# Patient Record
Sex: MALE | Race: WHITE | NOT HISPANIC OR LATINO | Employment: FULL TIME | URBAN - METROPOLITAN AREA
[De-identification: names, ages, dates, MRNs, and addresses within clinical notes are randomized per-mention and may not be internally consistent; named-entity substitution may affect disease eponyms.]

---

## 2023-06-30 ENCOUNTER — OFFICE VISIT (OUTPATIENT)
Dept: URGENT CARE | Facility: CLINIC | Age: 38
End: 2023-06-30
Payer: COMMERCIAL

## 2023-06-30 VITALS
RESPIRATION RATE: 20 BRPM | OXYGEN SATURATION: 99 % | HEIGHT: 73 IN | BODY MASS INDEX: 24.12 KG/M2 | SYSTOLIC BLOOD PRESSURE: 103 MMHG | HEART RATE: 67 BPM | DIASTOLIC BLOOD PRESSURE: 83 MMHG | TEMPERATURE: 97.8 F | WEIGHT: 182 LBS

## 2023-06-30 DIAGNOSIS — L01.00 IMPETIGO: Primary | ICD-10-CM

## 2023-06-30 RX ORDER — TRIAMCINOLONE ACETONIDE 1 MG/G
CREAM TOPICAL 2 TIMES DAILY
Qty: 30 G | Refills: 0 | Status: SHIPPED | OUTPATIENT
Start: 2023-06-30

## 2023-06-30 RX ORDER — CEPHALEXIN 500 MG/1
500 CAPSULE ORAL EVERY 8 HOURS SCHEDULED
Qty: 21 CAPSULE | Refills: 0 | Status: SHIPPED | OUTPATIENT
Start: 2023-06-30 | End: 2023-07-07

## 2023-06-30 NOTE — PATIENT INSTRUCTIONS
Impetigo   WHAT YOU NEED TO KNOW:   Impetigo is a skin infection caused by bacteria  The infection can cause sores to form anywhere on your body  The sores develop watery or pus-filled blisters that break and form thick crusts  Impetigo is most common in children and spreads easily from person to person  DISCHARGE INSTRUCTIONS:   Return to the emergency department if:   You have painful, red, warm skin around the blisters  Your face is swollen  You urinate less than usual or there is blood in your urine  Contact your healthcare provider if:   You have a fever  The sores become more red, swollen, warm, or tender  The sores do not start to heal after 3 days of treatment  You have questions or concerns about your condition or care  Medicines:   Antibiotics  treat the bacterial infection  Antibiotics may be given as a pill or cream  Wash your skin and gently remove any crusts before you apply the antibiotic cream      Take your medicine as directed  Contact your healthcare provider if you think your medicine is not helping or if you have side effects  Tell your provider if you are allergic to any medicine  Keep a list of the medicines, vitamins, and herbs you take  Include the amounts, and when and why you take them  Bring the list or the pill bottles to follow-up visits  Carry your medicine list with you in case of an emergency  Prevent the spread of impetigo:   Avoid direct contact  You can spread impetigo if someone touches or uses something that touched your infected skin  You can also spread impetigo on your own body when you touch the area and then touch somewhere else  Keep the sores covered with gauze so you will not scratch or touch them  Keep your fingernails short  Your child may need to wear mittens so he does not scratch his sores  Wash your hands often  Always wash your hands after you touch the infected area  Wash your hands before you touch food, your eyes, or other people  If no water is available, use an alcohol-based gel to clean your hands  Wash household items  Do not share or reuse items that have come in contact with impetigo sores  Examples include bedding, towels, washcloths, and eating utensils  These items may be used again after they have been washed with hot water and soap  Clean your sores safely:  Wash your skin sores with antibacterial soap and water  You may need to do this 2 to 3 times each day until the sores heal  If the area is crusted, gently wash the sores with gauze or a clean washcloth to remove the crust  Pat the area dry with a clean towel  Wash your hands, the washcloth, and the towel after you clean the area around the sores  Return to work or school: You may return to work or school 48 hours after you start the antibiotic medicine  If your child has impetigo, tell his school or  center about the infection  Follow up with your doctor as directed:  Write down your questions so you remember to ask them during your visits  © Copyright [employer] 2022 Information is for End User's use only and may not be sold, redistributed or otherwise used for commercial purposes  The above information is an  only  It is not intended as medical advice for individual conditions or treatments  Talk to your doctor, nurse or pharmacist before following any medical regimen to see if it is safe and effective for you  Impetigo: Concern for underlying tinea secondary to the wrestling with superimposed impetigo/staphylococcal infection  Will treat with Keflex 500mg PO TID x 7 days as the rash has been worsening  Will also treat with topical bactroban  We discussed triamcinolone can be used for the contact dermatitis secondary to the bandaid  Keep the rash clean and dry otherwise  We discussed if there is underlying tinea we can treat once we clear the bacterial infection  If the rash spreads or worsens despite these measures follow up immediately

## 2023-06-30 NOTE — PROGRESS NOTES
[facility]        NAME: Rani Washington is a 41 y o  male  : 1985    MRN: [id number]  DATE: 2023  TIME: 11:34 AM    Assessment and Plan   Impetigo [L01 00]  1  Impetigo  triamcinolone (KENALOG) 0 1 % cream    mupirocin (BACTROBAN) 2 % ointment    cephalexin (KEFLEX) 500 mg capsule            Patient Instructions   Impetigo: Concern for underlying tinea secondary to the wrestling with superimposed impetigo/staphylococcal infection  Will treat with Keflex 500mg PO TID x 7 days as the rash has been worsening  Will also treat with topical bactroban  We discussed triamcinolone can be used for the contact dermatitis secondary to the bandaid  Keep the rash clean and dry otherwise  We discussed if there is underlying tinea we can treat once we clear the bacterial infection  If the rash spreads or worsens despite these measures follow up immediately  Follow up with PCP in 3-5 days  Proceed to  ER if symptoms worsen  Chief Complaint     Chief Complaint   Patient presents with   • Rash     Right upper arm vesicular rash  History of Present Illness       The patient is a 72-year-old male who presents today for an erythematous rash on the R upper arm  The patient states that two days ago he noticed a small papular lesion on the R upper arm that has progressed to a mildly pruritic lesion with worsening redness  There are now satellite lesions  No drainage, no pustular lesions  No fever, chills  He states that he does wrestle three times a week  No facial swelling, trouble swallowing  No new medications, lotions, detergents  No recent travel or sick contacts  No OTC measures  He states that he has had it covered with a bandaid which irritated the surrounding skin as well  Review of Systems   Review of Systems   Constitutional: Negative for activity change, appetite change, chills, diaphoresis, fatigue and fever     HENT: Negative for facial swelling, sore throat and trouble "swallowing  Respiratory: Negative for chest tightness, shortness of breath, wheezing and stridor  Cardiovascular: Negative for chest pain and palpitations  Skin: Positive for rash  Allergic/Immunologic: Negative for environmental allergies, food allergies and immunocompromised state  Neurological: Negative for dizziness and light-headedness  Hematological: Negative for adenopathy  Does not bruise/bleed easily  Current Medications       Current Outpatient Medications:   •  cephalexin (KEFLEX) 500 mg capsule, Take 1 capsule (500 mg total) by mouth every 8 (eight) hours for 7 days, Disp: 21 capsule, Rfl: 0  •  mupirocin (BACTROBAN) 2 % ointment, Apply topically 3 (three) times a day, Disp: 22 g, Rfl: 0  •  triamcinolone (KENALOG) 0 1 % cream, Apply topically 2 (two) times a day, Disp: 30 g, Rfl: 0    Current Allergies     Allergies as of 06/30/2023 - Reviewed 06/30/2023   Allergen Reaction Noted   • Vancomycin Vomiting 06/30/2023            The following portions of the patient's history were reviewed and updated as appropriate: allergies, current medications, past family history, past medical history, past social history, past surgical history and problem list      History reviewed  No pertinent past medical history  History reviewed  No pertinent surgical history  History reviewed  No pertinent family history  Medications have been verified  Objective   /83   Pulse 67   Temp 97 8 °F (36 6 °C)   Resp 20   Ht 6' 1\" (1 854 m)   Wt 82 6 kg (182 lb)   SpO2 99%   BMI 24 01 kg/m²   No LMP for male patient  Physical Exam     Physical Exam  Vitals and nursing note reviewed  Constitutional:       General: He is not in acute distress  Appearance: He is well-developed  He is not diaphoretic  HENT:      Mouth/Throat:      Pharynx: Uvula midline  Cardiovascular:      Rate and Rhythm: Normal rate and regular rhythm  Pulses: Normal pulses        Heart sounds: " Normal heart sounds, S1 normal and S2 normal  No murmur heard  Pulmonary:      Effort: Pulmonary effort is normal  No tachypnea, accessory muscle usage or respiratory distress  Breath sounds: Normal breath sounds  No stridor  No decreased breath sounds, wheezing, rhonchi or rales  Skin:     Capillary Refill: Capillary refill takes less than 2 seconds  Findings: Rash present  Rash is macular and papular  Comments: There is a 1cm maculopapular lesion on an erythematous base with honey colored crusting on the volar aspect of the R upper arm  There are 2-3 satellite lesions around the central lesion with eschar and honey colored crusting  There is a single lesion on the dorsal aspect of the L upper arm  No intact pustules or vesicles seen  No obvious tinea infection underlying

## 2023-07-08 ENCOUNTER — OFFICE VISIT (OUTPATIENT)
Dept: URGENT CARE | Facility: CLINIC | Age: 38
End: 2023-07-08
Payer: COMMERCIAL

## 2023-07-08 VITALS
OXYGEN SATURATION: 99 % | HEIGHT: 73 IN | SYSTOLIC BLOOD PRESSURE: 120 MMHG | BODY MASS INDEX: 24.12 KG/M2 | WEIGHT: 182 LBS | RESPIRATION RATE: 16 BRPM | TEMPERATURE: 97.1 F | DIASTOLIC BLOOD PRESSURE: 78 MMHG | HEART RATE: 64 BPM

## 2023-07-08 DIAGNOSIS — B35.9 TINEA: Primary | ICD-10-CM

## 2023-07-08 PROCEDURE — 99213 OFFICE O/P EST LOW 20 MIN: CPT | Performed by: PHYSICIAN ASSISTANT

## 2023-07-08 RX ORDER — CLOTRIMAZOLE 1 %
CREAM (GRAM) TOPICAL 2 TIMES DAILY
Qty: 85 G | Refills: 0 | Status: SHIPPED | OUTPATIENT
Start: 2023-07-08

## 2023-07-08 NOTE — PROGRESS NOTES
[facility]        NAME: Kasandra Randhawa is a 41 y.o. male  : 1985    MRN: [id number]  DATE: 2023  TIME: 1:42 PM    Assessment and Plan   Tinea [B35.9]  1. Tinea  clotrimazole (LOTRIMIN) 1 % cream    Ambulatory Referral to Dermatology            Patient Instructions     Patient Instructions   Will treat tinea with clotrimazole to be used as instructed. Advised to continue Dr. Cj Cao ointment around affected area as well in between applications. Provided referral to dermatology if symptoms are not improving or resolving over the next 3 to 4 days. With any progression or worsening of symptoms to return or be seen in ER. Follow up with PCP in 3-5 days. Proceed to  ER if symptoms worsen. Chief Complaint     Chief Complaint   Patient presents with   • Rash     Rash re-check         History of Present Illness       Patient is a 61-year-old male presenting today for reevaluation of rash x2 weeks. Patient was seen and evaluated here on 2023, was diagnosed with impetigo at the time, was treated with a course of Keflex, mupirocin and triamcinolone, notes that the rash quickly improved but states that there is still a few small lesions that are not healing well. Mentions discussion of possible fungal infection were had at last visit. Denies fever, discharge or drainage, N/V/D, numbness, tingling. Review of Systems   Review of Systems   Constitutional: Negative for chills and fever. HENT: Negative for ear pain and sore throat. Eyes: Negative for pain and visual disturbance. Respiratory: Negative for cough and shortness of breath. Cardiovascular: Negative for chest pain and palpitations. Gastrointestinal: Negative for abdominal pain and vomiting. Genitourinary: Negative for dysuria and hematuria. Musculoskeletal: Negative for arthralgias and back pain. Skin: Positive for rash. Neurological: Negative for seizures and syncope.    All other systems reviewed and are negative. Current Medications       Current Outpatient Medications:   •  clotrimazole (LOTRIMIN) 1 % cream, Apply topically 2 (two) times a day, Disp: 85 g, Rfl: 0  •  mupirocin (BACTROBAN) 2 % ointment, Apply topically 3 (three) times a day, Disp: 22 g, Rfl: 0  •  triamcinolone (KENALOG) 0.1 % cream, Apply topically 2 (two) times a day, Disp: 30 g, Rfl: 0    Current Allergies     Allergies as of 07/08/2023 - Reviewed 07/08/2023   Allergen Reaction Noted   • Vancomycin Vomiting 06/30/2023            The following portions of the patient's history were reviewed and updated as appropriate: allergies, current medications, past family history, past medical history, past social history, past surgical history and problem list.     History reviewed. No pertinent past medical history. History reviewed. No pertinent surgical history. History reviewed. No pertinent family history. Medications have been verified. Objective   /78   Pulse 64   Temp (!) 97.1 °F (36.2 °C)   Resp 16   Ht 6' 1" (1.854 m)   Wt 82.6 kg (182 lb)   SpO2 99%   BMI 24.01 kg/m²        Physical Exam     Physical Exam  Vitals and nursing note reviewed. Constitutional:       General: He is not in acute distress. Appearance: Normal appearance. HENT:      Head: Normocephalic. Cardiovascular:      Rate and Rhythm: Normal rate and regular rhythm. Pulses: Normal pulses. Heart sounds: Normal heart sounds. Pulmonary:      Effort: Pulmonary effort is normal.      Breath sounds: Normal breath sounds. Skin:     General: Skin is warm. Capillary Refill: Capillary refill takes less than 2 seconds. Findings: Rash present. Comments: There is a 1cm maculopapular lesion on an erythematous base on the volar aspect of the R upper arm. There are 2-3 satellite lesions around the central lesiong. There is a single lesion on the dorsal aspect of the L upper arm.   No surrounding erythema or streaking, no active discharge or drainage from areas   Neurological:      Mental Status: He is alert.

## 2023-07-08 NOTE — PATIENT INSTRUCTIONS
Will treat tinea with clotrimazole to be used as instructed. Advised to continue Dr. Juan Vasquez ointment around affected area as well in between applications. Provided referral to dermatology if symptoms are not improving or resolving over the next 3 to 4 days. With any progression or worsening of symptoms to return or be seen in ER.

## 2023-07-29 ENCOUNTER — OFFICE VISIT (OUTPATIENT)
Dept: URGENT CARE | Facility: CLINIC | Age: 38
End: 2023-07-29

## 2023-07-29 VITALS
WEIGHT: 183 LBS | HEART RATE: 48 BPM | BODY MASS INDEX: 24.25 KG/M2 | HEIGHT: 73 IN | OXYGEN SATURATION: 97 % | RESPIRATION RATE: 20 BRPM | TEMPERATURE: 97 F

## 2023-07-29 DIAGNOSIS — R23.8 VESICULAR RASH: Primary | ICD-10-CM

## 2023-07-29 PROCEDURE — 87205 SMEAR GRAM STAIN: CPT | Performed by: PHYSICIAN ASSISTANT

## 2023-07-29 PROCEDURE — 87186 SC STD MICRODIL/AGAR DIL: CPT | Performed by: PHYSICIAN ASSISTANT

## 2023-07-29 PROCEDURE — 87147 CULTURE TYPE IMMUNOLOGIC: CPT | Performed by: PHYSICIAN ASSISTANT

## 2023-07-29 PROCEDURE — 87070 CULTURE OTHR SPECIMN AEROBIC: CPT | Performed by: PHYSICIAN ASSISTANT

## 2023-07-29 NOTE — PROGRESS NOTES
[facility]        NAME: Francine Irvin is a 41 y.o. male  : 1985    MRN: [id number]  DATE: 2023  TIME: 11:39 AM    Assessment and Plan   Vesicular rash [R23.8]  1. Vesicular rash  Ambulatory Referral to Wound Care            Patient Instructions   Impetigo: Will culture the lesion to r/o staph infection. The patients son is being treated for bullous impetigo, the patients presentation is also consistent with impetigo. We discussed triamcinolone can be used for the itching but that he should be applying the Bactroban three times daily. Keep the rash clean and dry otherwise. If the rash spreads or worsens despite these measures follow up immediately. Dermatology referral given, he should be seen within the next 5 days. Follow up with PCP in 3-5 days. Proceed to  ER if symptoms worsen. Chief Complaint     Chief Complaint   Patient presents with   • Rash     Rt arm single infected area blisters with honey -like scabing         History of Present Illness       The patient is a 61-year-old male who presents today for ongoing/recurrent rash. This is his third visit with us for this problem. He was initially placed on bactroban and keflex for impetigo and then clotrimazole for underlying tinea. He was referred to dermatology. His son was diagnosed with bullous impetigo one week ago. The patient has a single lesion on the R upper arm with scabbing and serosanguinous drainage that began one day ago. The rash is pruritic, no burning or pain. No fever or chills. No OTC measures. Review of Systems   Review of Systems   Constitutional: Negative for activity change, appetite change, chills, diaphoresis, fatigue and fever. HENT: Negative for facial swelling, sore throat and trouble swallowing. Respiratory: Negative for chest tightness, shortness of breath, wheezing and stridor. Cardiovascular: Negative for chest pain and palpitations.    Musculoskeletal: Negative for arthralgias and myalgias. Skin: Positive for rash. Allergic/Immunologic: Negative for environmental allergies, food allergies and immunocompromised state. Neurological: Negative for dizziness and light-headedness. Hematological: Negative for adenopathy. Does not bruise/bleed easily. Current Medications       Current Outpatient Medications:   •  mupirocin (BACTROBAN) 2 % ointment, Apply topically 3 (three) times a day, Disp: 22 g, Rfl: 0  •  triamcinolone (KENALOG) 0.1 % cream, Apply topically 2 (two) times a day, Disp: 30 g, Rfl: 0    Current Allergies     Allergies as of 07/29/2023 - Reviewed 07/29/2023   Allergen Reaction Noted   • Fluconazole Fever 07/29/2023   • Vancomycin Vomiting 06/30/2023            The following portions of the patient's history were reviewed and updated as appropriate: allergies, current medications, past family history, past medical history, past social history, past surgical history and problem list.     No past medical history on file. No past surgical history on file. No family history on file. Medications have been verified. Objective   Pulse (!) 48   Temp (!) 97 °F (36.1 °C)   Resp 20   Ht 6' 1" (1.854 m)   Wt 83 kg (183 lb)   SpO2 97%   BMI 24.14 kg/m²   No LMP for male patient. Physical Exam     Physical Exam  Vitals and nursing note reviewed. Constitutional:       General: He is not in acute distress. Appearance: He is well-developed. He is not diaphoretic. HENT:      Mouth/Throat:      Pharynx: Uvula midline. Cardiovascular:      Rate and Rhythm: Normal rate and regular rhythm. Pulses: Normal pulses. Heart sounds: Normal heart sounds, S1 normal and S2 normal. No murmur heard. Pulmonary:      Effort: Pulmonary effort is normal. No tachypnea, accessory muscle usage or respiratory distress. Breath sounds: Normal breath sounds. No stridor. No decreased breath sounds, wheezing, rhonchi or rales.    Skin:     Capillary Refill: Capillary refill takes less than 2 seconds. Findings: Rash present. Rash is crusting, papular and vesicular. Comments: There is a 1cm annular lesion on the R upper arm with underlying erythema and large vesicles over the lesion oozing a serosanguinous drainage. No purulent drainage. Honey colored crusting is appreciated.

## 2023-07-29 NOTE — PATIENT INSTRUCTIONS
Impetigo: Will culture the lesion to r/o staph infection. The patients son is being treated for bullous impetigo, the patients presentation is also consistent with impetigo. We discussed triamcinolone can be used for the itching but that he should be applying the Bactroban three times daily. Keep the rash clean and dry otherwise. If the rash spreads or worsens despite these measures follow up immediately. Dermatology referral given, he should be seen within the next 5 days.

## 2023-07-30 LAB
BACTERIA WND AEROBE CULT: NORMAL
GRAM STN SPEC: NORMAL
GRAM STN SPEC: NORMAL

## 2023-08-01 LAB
BACTERIA WND AEROBE CULT: ABNORMAL
GRAM STN SPEC: ABNORMAL
GRAM STN SPEC: ABNORMAL

## 2023-08-03 ENCOUNTER — TELEPHONE (OUTPATIENT)
Dept: URGENT CARE | Facility: CLINIC | Age: 38
End: 2023-08-03

## 2023-08-03 NOTE — TELEPHONE ENCOUNTER
Pt called inquiring about results from wound culture. Grew 1+ staph aureus. He has been using Bactroban tid and is noticing significant improvement. Has appointment with wound care on 8/7 for recurrent impetigo. Advised him to continue use of Bactroban and keep follow up appointment. He verbalized understanding and is in agreement with plan.

## 2023-08-07 ENCOUNTER — OFFICE VISIT (OUTPATIENT)
Dept: WOUND CARE | Facility: HOSPITAL | Age: 38
End: 2023-08-07
Payer: COMMERCIAL

## 2023-08-07 VITALS
SYSTOLIC BLOOD PRESSURE: 118 MMHG | TEMPERATURE: 97.9 F | WEIGHT: 175 LBS | HEART RATE: 67 BPM | HEIGHT: 73 IN | RESPIRATION RATE: 14 BRPM | BODY MASS INDEX: 23.19 KG/M2 | DIASTOLIC BLOOD PRESSURE: 73 MMHG

## 2023-08-07 DIAGNOSIS — L01.01 NON-BULLOUS IMPETIGO: Primary | ICD-10-CM

## 2023-08-07 PROCEDURE — 97597 DBRDMT OPN WND 1ST 20 CM/<: CPT | Performed by: STUDENT IN AN ORGANIZED HEALTH CARE EDUCATION/TRAINING PROGRAM

## 2023-08-07 PROCEDURE — 99203 OFFICE O/P NEW LOW 30 MIN: CPT | Performed by: STUDENT IN AN ORGANIZED HEALTH CARE EDUCATION/TRAINING PROGRAM

## 2023-08-07 PROCEDURE — 99213 OFFICE O/P EST LOW 20 MIN: CPT | Performed by: STUDENT IN AN ORGANIZED HEALTH CARE EDUCATION/TRAINING PROGRAM

## 2023-08-07 RX ORDER — LIDOCAINE HYDROCHLORIDE 40 MG/ML
5 SOLUTION TOPICAL ONCE
Status: COMPLETED | OUTPATIENT
Start: 2023-08-07 | End: 2023-08-07

## 2023-08-07 RX ADMIN — LIDOCAINE HYDROCHLORIDE 5 ML: 40 SOLUTION TOPICAL at 13:53

## 2023-08-07 NOTE — PATIENT INSTRUCTIONS
Orders Placed This Encounter   Procedures    Wound cleansing and dressings     Wash your hands with soap and water. Remove old dressing, discard into plastic bag and place in trash. Cleanse the wound with soap and water prior to applying a clean dressing. Do not use tissue or cotton balls. Do not scrub the wound. Pat dry using gauze. Shower yes   Apply Bactroban to the arm wound. Cover with bordered gauze   Change dressing daily     Standing Status:   Future     Standing Expiration Date:   8/7/2024    Wound miscellaneous orders     Please follow up with a Primary Care Physician (PCP). Soham Phipps [phone] is a recommendation.      Standing Status:   Future     Standing Expiration Date:   8/7/2024

## 2023-08-07 NOTE — PROGRESS NOTES
Patient ID: Des Stevens is a 41 y.o. male Date of Birth 1985     Chief Complaint  Chief Complaint   Patient presents with   • New Patient Visit     Right arm wound       Allergies  Fluconazole and Vancomycin    Assessment:     Diagnoses and all orders for this visit:    Non-bullous impetigo  -     Wound cleansing and dressings; Future  -     Wound miscellaneous orders; Future  -     lidocaine (XYLOCAINE) 4 % topical solution 5 mL  -     Debridement              Debridement   Wound 08/07/23 Arm Anterior;Right;Upper    Universal Protocol:  Consent: Verbal consent obtained. Risks and benefits: risks, benefits and alternatives were discussed  Time out: Immediately prior to procedure a "time out" was called to verify the correct patient, procedure, equipment, support staff and site/side marked as required. Patient identity confirmed: verbally with patient      Performed by: physician  Debridement type: selective  Pain control: lidocaine 4%  Pre-debridement measurements  Length (cm): 3  Width (cm): 1.9  Depth (cm): 0.1  Surface Area (cm^2): 5.7  Volume (cm^3): 0.57    Post-debridement measurements  Length (cm): 3  Width (cm): 1.9  Depth (cm): 0.1  Percent debrided: 90%  Surface Area (cm^2): 5.7  Area debrided (cm^2): 5.13  Volume (cm^3): 0.57  Devitalized tissue debrided: biofilm and exudate  Instrument(s) utilized: curette  Bleeding: small  Hemostasis obtained with: pressure  Procedural pain (0-10): 1  Post-procedural pain: 0   Response to treatment: procedure was tolerated well          Plan:  • It was a pleasure to see Des Stevens for wound care consult today  • Selective debridement performed today as above  • Start plan of care as noted below with continued Bactroban to wound covered by bordered foam.  • Establish care with PCP. Information for [facility] given today. • No signs or symptoms of infection today.  Patient understands that if any signs of infection start (such as increased redness, drainage, pain, fever, chills, diaphoresis), they should call our office or proceed to the ER or Urgent Care. • Patient should continue a high protein diet to facilitate wound healing  • Patient is advised to not submerge wound or leave wound open to air. • Follow up in 1 weeks  • Given the multi-factorial nature of wound care, additional time was taken to review patient's treatment plan with other specialties and most recent pertinent lab work and imaging. • All plans of care discussed with patient at bedside who verbalized understanding with treatment plan. Wound 08/07/23 Arm Anterior;Right;Upper (Active)   Wound Image Images linked 08/07/23 1321   Wound Description Epithelialization;Pink;Dry;Eschar;Brown;Yellow 08/07/23 1321   Dayan-wound Assessment Intact 08/07/23 1321   Wound Length (cm) 3 cm 08/07/23 1321   Wound Width (cm) 1.9 cm 08/07/23 1321   Wound Depth (cm) 0.1 cm 08/07/23 1321   Wound Surface Area (cm^2) 5.7 cm^2 08/07/23 1321   Wound Volume (cm^3) 0.57 cm^3 08/07/23 1321   Calculated Wound Volume (cm^3) 0.57 cm^3 08/07/23 1321   Drainage Amount Scant 08/07/23 1321   Drainage Description Serous 08/07/23 1321   Non-staged Wound Description Full thickness 08/07/23 1321   Dressing Status Other (Comment) (open to air on arrival) 08/07/23 1321       Wound 08/07/23 Arm Anterior;Right;Upper (Active)   Date First Assessed/Time First Assessed: 08/07/23 1320   Location: Arm  Wound Location Orientation: Anterior;Right;Upper       Subjective:      .    8/7/23: Consult - 65-year-old male here today for wound care consult after being referred by urgent care. Patient was seen by urgent care twice in the past month. Initially diagnosed with impetigo and then with tinea. Patient has been on triamcinolone and most recently has been using Bactroban again for the wound. Notes that original lesions disappeared however final lesion has been ongoing for approximately 2 weeks.   Notably his son was also diagnosed with bullous impetigo. Patient is new to the area and has not established with PCP. He has no history of diabetes. He does not smoke or drink heavily. Denies any systemic signs of infection today. The following portions of the patient's history were reviewed and updated as appropriate: allergies, current medications, past family history, past medical history, past social history, past surgical history and problem list.    Review of Systems   Skin: Positive for wound. All other systems reviewed and are negative. Objective:       Wound 08/07/23 Arm Anterior;Right;Upper (Active)   Wound Image Images linked 08/07/23 1321   Wound Description Epithelialization;Pink;Dry;Eschar;Brown;Yellow 08/07/23 1321   Dayan-wound Assessment Intact 08/07/23 1321   Wound Length (cm) 3 cm 08/07/23 1321   Wound Width (cm) 1.9 cm 08/07/23 1321   Wound Depth (cm) 0.1 cm 08/07/23 1321   Wound Surface Area (cm^2) 5.7 cm^2 08/07/23 1321   Wound Volume (cm^3) 0.57 cm^3 08/07/23 1321   Calculated Wound Volume (cm^3) 0.57 cm^3 08/07/23 1321   Drainage Amount Scant 08/07/23 1321   Drainage Description Serous 08/07/23 1321   Non-staged Wound Description Full thickness 08/07/23 1321   Dressing Status Other (Comment) (open to air on arrival) 08/07/23 1321       /73   Pulse 67   Temp 97.9 °F (36.6 °C)   Resp 14   Ht 6' 1" (1.854 m)   Wt 79.4 kg (175 lb)   BMI 23.09 kg/m²     Physical Exam  Vitals reviewed. Constitutional:       Appearance: Normal appearance. HENT:      Head: Normocephalic and atraumatic. Eyes:      Extraocular Movements: Extraocular movements intact. Pulmonary:      Effort: Pulmonary effort is normal.   Musculoskeletal:      Cervical back: Neck supple. Skin:     Comments: Yellow exudate crusted wound of lateral right upper arm. Neurological:      Mental Status: He is alert.    Psychiatric:         Mood and Affect: Mood normal.             Wound Instructions:  Orders Placed This Encounter Procedures   • Wound cleansing and dressings     Wash your hands with soap and water. Remove old dressing, discard into plastic bag and place in trash. Cleanse the wound with soap and water prior to applying a clean dressing. Do not use tissue or cotton balls. Do not scrub the wound. Pat dry using gauze. Shower yes   Apply Bactroban to the arm wound. Cover with bordered gauze   Change dressing daily     Standing Status:   Future     Standing Expiration Date:   8/7/2024   • Wound miscellaneous orders     Please follow up with a Primary Care Physician (PCP). [address] [phone] is a recommendation. Standing Status:   Future     Standing Expiration Date:   8/7/2024   • Debridement     This order was created via procedure documentation        Diagnosis ICD-10-CM Associated Orders   1.  Non-bullous impetigo  L01.01 Wound cleansing and dressings     Wound miscellaneous orders     lidocaine (XYLOCAINE) 4 % topical solution 5 mL     Debridement        neuroforaminal encroachment

## 2023-08-14 ENCOUNTER — OFFICE VISIT (OUTPATIENT)
Dept: WOUND CARE | Facility: HOSPITAL | Age: 38
End: 2023-08-14
Payer: COMMERCIAL

## 2023-08-14 VITALS
HEART RATE: 76 BPM | TEMPERATURE: 97.3 F | SYSTOLIC BLOOD PRESSURE: 121 MMHG | DIASTOLIC BLOOD PRESSURE: 68 MMHG | RESPIRATION RATE: 15 BRPM

## 2023-08-14 DIAGNOSIS — L01.01 NON-BULLOUS IMPETIGO: Primary | ICD-10-CM

## 2023-08-14 PROCEDURE — 99212 OFFICE O/P EST SF 10 MIN: CPT | Performed by: STUDENT IN AN ORGANIZED HEALTH CARE EDUCATION/TRAINING PROGRAM

## 2023-08-14 NOTE — PROGRESS NOTES
Patient ID: Ashley Scott is a 41 y.o. male Date of Birth 1985     Chief Complaint  Chief Complaint   Patient presents with   • Follow Up Wound Care Visit     Right arm       Allergies  Fluconazole and Vancomycin    Assessment:     Diagnoses and all orders for this visit:    Non-bullous impetigo  -     Wound cleansing and dressings; Future  -     Wound cleansing and dressings; Future  -     Wound miscellaneous orders; Future                Plan:  • It was a pleasure to see Ashley Scott for wound care follow up today  • Wound is fully epithelialized today. Follow-up in the future as needed with wound management center. Patient is new to the area. Advised to establish with PCP. Information for [facility] given at last visit. • All plans of care discussed with patient at bedside who verbalized understanding with treatment plan. Wound 08/07/23 Arm Anterior;Right;Upper (Active)   Wound Image Images linked (Simultaneous filing. User may not have seen previous data.) 08/14/23 1026   Wound Description Epithelialization;Pink;Dry;Brown;Yellow; Other (Comment) (scab) 08/14/23 1026   Dayan-wound Assessment Intact 08/14/23 1026   Wound Length (cm) 0 cm 08/14/23 1026   Wound Width (cm) 0 cm 08/14/23 1026   Wound Depth (cm) 0 cm 08/14/23 1026   Wound Surface Area (cm^2) 0 cm^2 08/14/23 1026   Wound Volume (cm^3) 0 cm^3 08/14/23 1026   Calculated Wound Volume (cm^3) 0 cm^3 08/14/23 1026   Change in Wound Size % 100 08/14/23 1026   Drainage Amount None 08/14/23 1026   Non-staged Wound Description Not applicable 50/10/90 9796   Dressing Status Intact (upon arrival) 08/14/23 1026       Wound 08/07/23 Arm Anterior;Right;Upper (Active)   Date First Assessed/Time First Assessed: 08/07/23 1320   Location: Arm  Wound Location Orientation: Anterior;Right;Upper  Wound Outcome: Healed       Subjective:      .    8/14/23: Has been applying Bactroban and keeping wound covered. Believes wound is closed today.   Has not noticed drainage in several days. 8/7/23: Consult - 82-year-old male here today for wound care consult after being referred by urgent care. Patient was seen by urgent care twice in the past month. Initially diagnosed with impetigo and then with tinea. Patient has been on triamcinolone and most recently has been using Bactroban again for the wound. Notes that original lesions disappeared however final lesion has been ongoing for approximately 2 weeks. Notably his son was also diagnosed with bullous impetigo. Patient is new to the area and has not established with PCP. He has no history of diabetes. He does not smoke or drink heavily. Denies any systemic signs of infection today. The following portions of the patient's history were reviewed and updated as appropriate: allergies, current medications, past family history, past medical history, past social history, past surgical history and problem list.    Review of Systems   All other systems reviewed and are negative. Objective:       Wound 08/07/23 Arm Anterior;Right;Upper (Active)   Wound Image Images linked (Simultaneous filing. User may not have seen previous data.) 08/14/23 1026   Wound Description Epithelialization;Pink;Dry;Brown;Yellow; Other (Comment) (scab) 08/14/23 1026   Dayan-wound Assessment Intact 08/14/23 1026   Wound Length (cm) 0 cm 08/14/23 1026   Wound Width (cm) 0 cm 08/14/23 1026   Wound Depth (cm) 0 cm 08/14/23 1026   Wound Surface Area (cm^2) 0 cm^2 08/14/23 1026   Wound Volume (cm^3) 0 cm^3 08/14/23 1026   Calculated Wound Volume (cm^3) 0 cm^3 08/14/23 1026   Change in Wound Size % 100 08/14/23 1026   Drainage Amount None 08/14/23 1026   Non-staged Wound Description Not applicable 56/66/06 8342   Dressing Status Intact (upon arrival) 08/14/23 1026       /68   Pulse 76   Temp (!) 97.3 °F (36.3 °C)   Resp 15     Physical Exam  Vitals reviewed. Constitutional:       Appearance: Normal appearance.    HENT:      Head: Normocephalic and atraumatic. Eyes:      Extraocular Movements: Extraocular movements intact. Pulmonary:      Effort: Pulmonary effort is normal.   Musculoskeletal:      Cervical back: Neck supple. Skin:     Comments: Dried exudate over the lateral right arm. Fully epithelialized skin. Neurological:      Mental Status: He is alert. Psychiatric:         Mood and Affect: Mood normal.               Wound Instructions:  Orders Placed This Encounter   Procedures   • Wound cleansing and dressings     Standing Status:   Future     Standing Expiration Date:   8/14/2024   • Wound cleansing and dressings     Right Arm Wound is healed. Please establish a primary care physician and follow up with the office. Shower: Yes    You are discharged from the [address] If you have any issues/concerns, please call the  [address]. Standing Status:   Future     Standing Expiration Date:   8/14/2024   • Wound miscellaneous orders     Please follow up with a Primary Care Physician (PCP). Soham Phipps [phone] is a recommendation. Standing Status:   Future     Standing Expiration Date:   8/14/2024        Diagnosis ICD-10-CM Associated Orders   1. Non-bullous impetigo  L01.01 Wound cleansing and dressings     Wound cleansing and dressings     Wound miscellaneous orders           --  Gianluca Hoang MD    "This note has been constructed using a voice recognition system. Therefore there may be syntax, spelling, and/or grammatical errors. Occasional wrong word or "sound alike" substitutions may have occurred due to the inherent limitations of voice recognition software. Read the chart carefully and recognize, using context, where substitutions have occurred.  Please call if you have any questions."

## 2023-08-14 NOTE — PATIENT INSTRUCTIONS
Orders Placed This Encounter   Procedures    Wound cleansing and dressings     Standing Status:   Future     Standing Expiration Date:   8/14/2024    Wound cleansing and dressings     Right Arm Wound is healed. Please establish a primary care physician and follow up with the office. Shower: Yes    You are discharged from the [address]. If you have any issues/concerns, please call the  [address]. Standing Status:   Future     Standing Expiration Date:   8/14/2024    Wound miscellaneous orders     Please follow up with a Primary Care Physician (PCP). [facility] [address] [phone] is a recommendation.      Standing Status:   Future     Standing Expiration Date:   8/14/2024

## 2023-09-07 ENCOUNTER — OFFICE VISIT (OUTPATIENT)
Dept: PHYSICAL THERAPY | Facility: CLINIC | Age: 38
End: 2023-09-07
Payer: COMMERCIAL

## 2023-09-07 DIAGNOSIS — B35.9 TINEA: ICD-10-CM

## 2023-09-07 DIAGNOSIS — M25.562 PAIN IN LATERAL PORTION OF LEFT KNEE: Primary | ICD-10-CM

## 2023-09-07 PROCEDURE — 97112 NEUROMUSCULAR REEDUCATION: CPT

## 2023-09-07 PROCEDURE — 97161 PT EVAL LOW COMPLEX 20 MIN: CPT

## 2023-09-07 NOTE — PROGRESS NOTES
PT Evaluation     Today's date: 2023  Patient name: Morales Trujillo  : 1985  MRN: [id number]  Referring provider: Self, Referral  Dx:   Encounter Diagnosis     ICD-10-CM    1. Pain in lateral portion of left knee  M25.562       2. Tinea  B35.9 Ambulatory Referral to Dermatology          Start Time: 3903  Stop Time: 6812  Total time in clinic (min): 43 minutes    Assessment  Assessment details: Pt is a pleasant 41 y.o. male who presents to [facility] with L knee pain associated w/ PCL, MCL sprain sustained during [employer] match on . Today, he presents with decreased and painful L knee ROM and joint mobility, decreased B LE and core strength, high self reports of pain, and decreased tolerance to activity. Functionally, he is limited in his ability to perform functional transfers, perform prolonged WB, perform normal training for marathons and martial arts, and participate in regular home activities. He is motivated to improve. Pt will benefit from skilled PT to address the aforementioned deficits and limitations in an effort to maximize pain free functional mobility and overall quality of life. Progress as able with these goals in mind. Impairments: abnormal gait, abnormal muscle firing, abnormal muscle tone, abnormal or restricted ROM, abnormal movement, activity intolerance, impaired physical strength, lacks appropriate home exercise program and pain with function  Understanding of Dx/Px/POC: good   Prognosis: good    Goals  Short term goals (3 weeks):  1) Pt will improve L knee AROM to WNL pain free. 2) Pt will improve B LE and core strength deficits by 1/3 grade MMT. 3) Pt will reports pain at worse <5/10. 4) Pt will initiate and progress HEP w/ special emphasis on functional core/hip/quad stability as it relates to knee function.     Long term goals (6 weeks)  1) Pt will improve FOTO to at least 74.    2) Pt will stand and ambulate community distances, to include stairs and change of surface, w/o deficit or pain. 3) Pt will perform two full weeks of light martial arts training w/o deficit related to knee. 4) Pt will be independent and compliant w/ HEP in order to maximize functional benefit of skilled PT following d/c.       Plan  Plan details: HEP to start: SAQ, QS, SLR, bridge progressions, sit<>stand, clam progressions    Patient would benefit from: skilled PT  Planned modality interventions: cryotherapy and thermotherapy: hydrocollator packs  Planned therapy interventions: abdominal trunk stabilization, activity modification, joint mobilization, manual therapy, massage, motor coordination training, neuromuscular re-education, patient education, postural training, stretching, strengthening, therapeutic activities, therapeutic exercise, therapeutic training, transfer training, home exercise program, gait training, graded activity, functional ROM exercises, graded exercise, flexibility, body mechanics training, balance and balance/weight bearing training  Frequency: 2x week  Duration in visits: 12  Duration in weeks: 6  Treatment plan discussed with: patient        Subjective Evaluation    History of Present Illness  Mechanism of injury: On 8/26 pt was injured while participating in "[employer] match - notes mechanism where he was driving L foot up towards body while knee drove away. MRI was negative for any tear, was told he had strain of PCL and MCL, as well as posterolateral knee structures. Could not walk after initial injury, until 9/5 he was NWB per MD orders. Tuesday morning he started walking, arrives today w/o UE support No longer taking prescribed NSAIDs. Knee feels unsteady w/o brace. Feels sore "as in muscular soreness." main goal is to return to competition for "[employer] participate in marathons and triathlons next year. Functional update below:      Follows up w/ ortho on 10/5, would like to be back to competition in mid November  Quality of life: good    Patient Goals  Patient goals for therapy: increased strength, decreased pain, increased motion and return to sport/leisure activities  Patient goal: return to all athletic activities, competition in November,  next year  Pain  Current pain ratin  At best pain ratin  At worst pain rating: 10  Location: L lateral knee, along L posterior knee  Quality: sharp, throbbing and tight  Relieving factors: ice, rest and change in position (sitting w/ leg extended is most comfortable)  Aggravating factors: standing, walking, stair climbing, lifting and running (functional transfers, stairs (up>down), walking >30 mins)  Progression: improved    Social Support  Steps to enter house: yes  Stairs in house: yes   Lives in: multiple-level home  Lives with: spouse and young children (two young kids)    Employment status: working (works for home, 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[employer])        Objective     Palpation     Additional Palpation Details  TTP on L lateral distal HS insertion, along L LCL and posterior aspect of L knee     Neurological Testing     Sensation     Knee   Left Knee   Intact: light touch    Right Knee   Intact: light touch     Active Range of Motion   Left Knee   Flexion: 125 degrees with pain  Extension: 0 degrees     Right Knee   Flexion: 140 degrees   Extension: 0 degrees     Additional Active Range of Motion Details  Pain at 115, moves to 125 on L    Passive Range of Motion   Left Knee   Flexion: 130 degrees with pain  Extension: 0 degrees     Right Knee   Normal passive range of motion    Strength/Myotome Testing     Left Knee   Flexion: 4-  Extension: 3+    Right Knee   Flexion: 5  Extension: 5    Additional Strength Details  LE Strength (R/L)    Hip  Flex 4+/5 , 4/5  Ext 4+/5 , 4/5  Abd 4+/5 , 4/5  Add 4+/5 , 4/5    Core Strength: at least 3/5     *Indicates pain      Tests     Additional Tests Details  (-) for any ligamentous laxity     Ambulation     Ambulation: Level Surfaces     Additional Level Surfaces Ambulation Details  Relatively unremarkable over clinical distances     General Comments:      Knee Comments  Sit<>stand: UE support on LE w/ R side WS     BW squat: through 50% before R side WS and min L knee pain     Stairs: TBA     SLS: painful on L         Flowsheet Rows    Flowsheet Row Most Recent Value   PT/OT G-Codes    Current Score 41   Projected Score 74   FOTO information reviewed Yes              POC expires Auth Status Total   Visits  Start date  Expiration date PT/OT + Visit Limit?  Co-Insurance    Not req     No                                         Dummy Auth Tracking  1 2 3 4 5 6   7 8 9 10 11 12   13 14 15 16 17 18   19 20 21 22 23 24   25 26 27 28 29 30   31 32 33 34 35 36         Precautions: standard     Date (Visit #) 9/7 IE (1) (2) (3) (4) (5)   Manual              DTM and TPR                                                                        Exercise Diary         Ther Ex        Active w/u             PROM  tested B            HS/glute/piri stretch  tested            QS, SLR, hip abd  QS x10  SAQ x10  SLR attempted            Active mobility                                                Neuro Re Ed        Bridging Reg x10, w/ ad sq x10, marching x10 attempted            TrA progressions            Squat training Sit<>stand elevated surface x5-10 total           Hip Abd Progressions S/l clams x 10, w/ hip IR x10           Balance                 HEP and POC review  x8-10 mins total         4" ant taps        Ant tib raises         QING SS              Ther Act             stairs             gait             Sit<>stand                                                                                                                  Modalities             heat             Ice
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
## 2023-09-11 ENCOUNTER — OFFICE VISIT (OUTPATIENT)
Dept: PHYSICAL THERAPY | Facility: CLINIC | Age: 38
End: 2023-09-11
Payer: COMMERCIAL

## 2023-09-11 DIAGNOSIS — M25.562 PAIN IN LATERAL PORTION OF LEFT KNEE: Primary | ICD-10-CM

## 2023-09-11 PROCEDURE — 97112 NEUROMUSCULAR REEDUCATION: CPT

## 2023-09-11 PROCEDURE — 97110 THERAPEUTIC EXERCISES: CPT

## 2023-09-11 NOTE — PROGRESS NOTES
Daily Note     Today's date: 2023  Patient name: Amanda Sharma  : 1985  MRN: [id number]  Referring provider: Self, Referral  Dx:   Encounter Diagnosis     ICD-10-CM    1. Pain in lateral portion of left knee  M25.562                      Subjective: Pt reports no new complaints. Reports that he feels better than last week, feels that strength is slowly returning. Walking is getting better. Objective: tolerates squat to 90 deg w/ equal WB and good glute drive to finish session x10 reps - quick fatigue but form well maintained throughout. Assessment: Tolerated treatment well. Patient demonstrated fatigue post treatment and would benefit from continued PT. Does well w/ exercise progressions, updated HEP to reflect below. Will add higher level quad and hip work barring setback. Plan: Continue per plan of care. retro work, squat challenges, leg press       POC expires Auth Status Total   Visits  Start date  Expiration date PT/OT + Visit Limit?  Co-Insurance    Not req     No                                         Dummy Auth Tracking  1 2 3 4 5 6   7 8 9 10 11 12   13 14 15 16 17 18   19 20 21 22 23 24   25 26 27 28 29 30   31 32 33 34 35 36         Precautions: standard     Date (Visit #)  IE (1)  (2) (3) (4) (5)   Manual              DTM and TPR                                                                        Exercise Diary         Ther Ex        Active w/u    upright post x 7 min lvl 4-5         PROM  tested B   x2-3 mins    IASTM to L prox calf x5 mins         HS/glute/piri stretch  tested   x3-4 min L HS and calf         QS, SLR, hip abd  QS x10  SAQ x10  SLR attempted   reg SLR x10, cw/ccw SLR and hip abd 2x10 each          Active mobility                                                Neuro Re Ed        Bridging Reg x10, w/ ad sq x10, marching x10 attempted   rev         TrA progressions            Squat training Sit<>stand elevated surface x5-10 total  reg 2x10 throughout, L HE x10-15    QING SS 2x10 B         Hip Abd Progressions S/l clams x 10, w/ hip IR x10  rev         Balance   Ant tib raises 2x15  Heel raises reg and soleus 2x10    Singles intro              HEP and POC review  x8-10 mins total x2-3 mins         HE ant taps x20         Retro walking         HF work        Single leg burners      Ther Act             stairs             gait             Sit<>stand                                                                                                                  Modalities             heat             Ice

## 2023-09-13 ENCOUNTER — OFFICE VISIT (OUTPATIENT)
Dept: PHYSICAL THERAPY | Facility: CLINIC | Age: 38
End: 2023-09-13
Payer: COMMERCIAL

## 2023-09-13 DIAGNOSIS — M25.562 PAIN IN LATERAL PORTION OF LEFT KNEE: Primary | ICD-10-CM

## 2023-09-13 PROCEDURE — 97110 THERAPEUTIC EXERCISES: CPT

## 2023-09-13 PROCEDURE — 97112 NEUROMUSCULAR REEDUCATION: CPT

## 2023-09-18 ENCOUNTER — OFFICE VISIT (OUTPATIENT)
Dept: PHYSICAL THERAPY | Facility: CLINIC | Age: 38
End: 2023-09-18
Payer: COMMERCIAL

## 2023-09-18 DIAGNOSIS — M25.562 PAIN IN LATERAL PORTION OF LEFT KNEE: Primary | ICD-10-CM

## 2023-09-18 DIAGNOSIS — B35.9 TINEA: ICD-10-CM

## 2023-09-18 PROCEDURE — 97112 NEUROMUSCULAR REEDUCATION: CPT

## 2023-09-18 PROCEDURE — 97110 THERAPEUTIC EXERCISES: CPT

## 2023-09-18 NOTE — PROGRESS NOTES
Daily Note     Today's date: 2023  Patient name: Tamiko Grayson  : 1985  MRN: [id number]  Referring provider: Self, Referral  Dx:   Encounter Diagnosis     ICD-10-CM    1. Pain in lateral portion of left knee  M25.562       2. Tinea  B35.9                      Subjective: Pt reports no new complaints. Reports that he was sore but felt good after last visit. Went apple picking with family over weekend, notes that his knee felt stable. Objective: requires cueing for full range and proper quad drive during QING SS, corrects and is able to maintain even w/ resistance      Assessment: Tolerated treatment well. Patient demonstrated fatigue post treatment and would benefit from continued PT. Continues to show excellent tolerance to exercise progressions. Will benefit from progressive increases in resistance training, will attempt to introduce very basic plyos at next visit barring setback. Pt in agreement w/ plan. Plan: Continue per plan of care. gentle A skips, single hops, mini squat jumps - prep for weekend HEP     POC expires Auth Status Total   Visits  Start date  Expiration date PT/OT + Visit Limit?  Co-Insurance    Not req     No                                         Dummy Auth Tracking  1 2 3 4 5 6   7 8 9 10 11 12   13 14 15 16 17 18   19 20 21 22 23 24   25 26 27 28 29 30   31 32 33 34 35 36         Precautions: standard     Date (Visit #)  IE (1)  (2)  (3)  (4) (5)   Manual              DTM and TPR                                                                        Exercise Diary         Ther Ex        Active w/u    upright post x 7 min lvl 4-5  upright pre 7 min lvl   upright pre 5-6 min lvl 3-4     PROM  tested B   x2-3 mins    IASTM to L prox calf x5 mins  x2-3 mins    x5-6 mins x2-3 mins     x5-6 mins same        HS/glute/piri stretch  tested   x3-4 min L HS and calf  x2-3 mins  x3-4 mins      QS, SLR, hip abd  QS x10  SAQ x10  SLR attempted   reg SLR x10, cw/ccw SLR and hip abd 2x10 each   rev       Active mobility        Leg press    135# x10  Singles 65# on L, 95# on R 3x8                                    Neuro Re Ed        Bridging Reg x10, w/ ad sq x10, marching x10 attempted   rev  x20 w/ march  x10 reg, march 2x10     TrA progressions            Squat training Sit<>stand elevated surface x5-10 total  reg 2x10 throughout, L HE x10-15    QING SS 2x10 B  2x10 reg    Lateral kossack TRX x20    Kickstand or single leg 2x10 B  26# goblet 2x8  Rev       Hip Abd Progressions S/l clams x 10, w/ hip IR x10  rev  rev QING SS HE 2x10 reg w/o UE support , 18# KB 3 ways x 5 each     Balance   Ant tib raises 2x15  Heel raises reg and soleus 2x10    Singles intro x20      x20 each 6" KB HF 18# lift 2x10            HEP and POC review  x8-10 mins total x2-3 mins  x2-3 mins  Rev x2-3 mins       HE ant taps x20         Retro walking CC 22. 5# x10 total  rev               Single leg burners x10 reg, w/ mini squat 2x10 B     Ther Act             stairs             gait             Sit<>stand                                                                                                                  Modalities             heat             Ice

## 2023-09-20 ENCOUNTER — OFFICE VISIT (OUTPATIENT)
Dept: PHYSICAL THERAPY | Facility: CLINIC | Age: 38
End: 2023-09-20
Payer: COMMERCIAL

## 2023-09-20 DIAGNOSIS — M25.562 PAIN IN LATERAL PORTION OF LEFT KNEE: Primary | ICD-10-CM

## 2023-09-20 DIAGNOSIS — B35.9 TINEA: ICD-10-CM

## 2023-09-20 PROCEDURE — 97112 NEUROMUSCULAR REEDUCATION: CPT

## 2023-09-20 PROCEDURE — 97110 THERAPEUTIC EXERCISES: CPT

## 2023-09-20 NOTE — PROGRESS NOTES
Daily Note     Today's date: 2023  Patient name: David Guthrie  : 1985  MRN: [id number]  Referring provider: Self, Referral  Dx:   Encounter Diagnosis     ICD-10-CM    1. Pain in lateral portion of left knee  M25.562       2. Tinea  B35.9                      Subjective: Pt reports no new complaints. Felt good after last visit, 6/10 fatigue/soreness overall. Objective: requires cueing for ecc control and proper landing mechanics w/ intro to jump training. Corrects and is able to maintain. Assessment: Tolerated treatment well. Patient demonstrated fatigue post treatment and would benefit from continued PT. Does well w/ exercise progressions. Shows good tolerance to strength work, controls deeper ranges of motion w/ less need for UE support or trunk deviation. Introduced gentle plyos w/ two feet only to start. Sx well controlled throughout. Pt was instructed to stay away from single leg plyos as these immediately increased sx, will re-introduce these next week barring setback. Plan: Continue per plan of care. double to single plyos, higher level strength work as able - HF work, QING progressions, scissor jumps, drop landings     POC expires Auth Status Total   Visits  Start date  Expiration date PT/OT + Visit Limit?  Co-Insurance    Not req     No                                         Dummy Auth Tracking  1 2 3 4 5 6   7 8 9 10 11 12   13 14 15 16 17 18   19 20 21 22 23 24   25 26 27 28 29 30   31 32 33 34 35 36         Precautions: standard     Date (Visit #)  IE (1)  (2)  (3)  (4)  (5)   Manual              DTM and TPR                                                                        Exercise Diary         Ther Ex        Active w/u    upright post x 7 min lvl 4-5  upright pre 7 min lvl   upright pre 5-6 min lvl 3-4 rec pre 5 min lvl 6-7    PROM  tested B   x2-3 mins    IASTM to L prox calf x5 mins  x2-3 mins    x5-6 mins x2-3 mins     x5-6 mins same    x2 mins x7 min same     HS/glute/piri stretch  tested   x3-4 min L HS and calf  x2-3 mins  x3-4 mins  x3-4 mins    QS, SLR, hip abd  QS x10  SAQ x10  SLR attempted   reg SLR x10, cw/ccw SLR and hip abd 2x10 each   rev       Active mobility        Leg press    135# x10  Singles 65# on L, 95# on R 3x8 NV                                   Neuro Re Ed        Bridging Reg x10, w/ ad sq x10, marching x10 attempted   rev  x20 w/ march  x10 reg, march 2x10  x20 singles    TrA progressions            Squat training Sit<>stand elevated surface x5-10 total  reg 2x10 throughout, L HE x10-15    QING SS 2x10 B  2x10 reg    Lateral kossack TRX x20    Kickstand or single leg 2x10 B 26# goblet 2x8  Rev   26# goblet w/ lateral shifts x10-15 B     Dynamic forward lunge, lateral step 2x10    Hip Abd Progressions S/l clams x 10, w/ hip IR x10  rev  rev QING SS HE 2x10 reg w/o UE support , 18# KB 3 ways x 5 each QING splits x10 reg, 26# rack hold 2x10 B   Balance   Ant tib raises 2x15  Heel raises reg and soleus 2x10    Singles intro x20      x20 each 6" KB HF 18# lift 2x10 Rev            HEP and POC review  x8-10 mins total x2-3 mins  x2-3 mins  Rev x2-3 mins  Rev x2-3 mins      HE ant taps x20         Retro walking CC 22. 5# x10 total  rev rev        Jump squats 2x10    Singles attempted     noemy's intro'd      Single leg burners x10 reg, w/ mini squat 2x10 B     Ther Act             stairs             gait             Sit<>stand                                                                                                                  Modalities             heat             Ice

## 2023-09-25 ENCOUNTER — OFFICE VISIT (OUTPATIENT)
Dept: PHYSICAL THERAPY | Facility: CLINIC | Age: 38
End: 2023-09-25
Payer: COMMERCIAL

## 2023-09-25 DIAGNOSIS — M25.562 PAIN IN LATERAL PORTION OF LEFT KNEE: Primary | ICD-10-CM

## 2023-09-25 PROCEDURE — 97110 THERAPEUTIC EXERCISES: CPT

## 2023-09-25 PROCEDURE — 97112 NEUROMUSCULAR REEDUCATION: CPT

## 2023-09-25 NOTE — PROGRESS NOTES
Daily Note     Today's date: 2023  Patient name: Kimberlee Serna  : 1985  MRN: [id number]  Referring provider: Self, Referral  Dx:   Encounter Diagnosis     ICD-10-CM    1. Pain in lateral portion of left knee  M25.562                      Subjective: Pt reports no new complaints today. Notes that his upper calf is a bit sore, moreso than the rest of the leg. Feels he is improving overall. Objective: requires cueing for symmetry and control w/ double leg jump squat landing, corrects despite quick quad fatigue. Assessment: Tolerated treatment well. Patient demonstrated fatigue post treatment and would benefit from continued PT. Does well w/ slide board work in addition to dynamic progressions. Easy lateral progressions caused increased medial knee soreness, slide board work can be performed pain free. Will benefit from more aggressive strength challenges barring setback, progress to higher level dynamic work if able. Plan: Continue per plan of care. drop landings, retro walking, single leg hop progressions      POC expires Auth Status Total   Visits  Start date  Expiration date PT/OT + Visit Limit?  Co-Insurance    Not req     No                                         Dummy Auth Tracking  1 2 3 4 5 6   7 8 9 10 11 12   13 14 15 16 17 18   19 20 21 22 23 24   25 26 27 28 29 30   31 32 33 34 35 36         Precautions: standard     Date (Visit #)  IE (1)  (2)  (3)  (4)  (5)  (6)    Manual               DTM and TPR                                                                             Exercise Diary          Ther Ex         Active w/u    upright post x 7 min lvl 4-5  upright pre 7 min lvl   upright pre 5-6 min lvl 3-4 rec pre 5 min lvl 6-7  Upright pre x 6-7 mins lvl 2-3    PROM  tested B   x2-3 mins    IASTM to L prox calf x5 mins  x2-3 mins    x5-6 mins x2-3 mins     x5-6 mins same    x2 mins     x7 min same   x2-3 mins     x8 mins   HS/glute/piri stretch  tested x3-4 min L HS and calf  x2-3 mins  x3-4 mins  x3-4 mins  x2-3 mins    QS, SLR, hip abd  QS x10  SAQ x10  SLR attempted   reg SLR x10, cw/ccw SLR and hip abd 2x10 each   rev        Active mobility         Leg press    135# x10  Singles 65# on L, 95# on R 3x8 NV                                        Neuro Re Ed         Bridging Reg x10, w/ ad sq x10, marching x10 attempted   rev  x20 w/ march  x10 reg, march 2x10  x20 singles  Singles x20 B    TrA progressions             Squat training Sit<>stand elevated surface x5-10 total  reg 2x10 throughout, L HE x10-15    QING SS 2x10 B  2x10 reg    Lateral kossack TRX x20    Kickstand or single leg 2x10 B 26# goblet 2x8  Rev   26# goblet w/ lateral shifts x10-15 B     Dynamic forward lunge, lateral step 2x10  Reg x10    Jump 3x10 total  Jump fwd/bckwd 2x10       Hip Abd Progressions S/l clams x 10, w/ hip IR x10  rev  rev QING SS HE 2x10 reg w/o UE support , 18# KB 3 ways x 5 each QING splits x10 reg, 26# rack hold 2x10 B BW w/ HE x 10, 26# KB 2x10 B   Balance   Ant tib raises 2x15  Heel raises reg and soleus 2x10    Singles intro x20      x20 each 6" KB HF 18# lift 2x10 Rev  Slide board ant and lat lunges 2x10-15, second set w/ 26# KB             HEP and POC review  x8-10 mins total x2-3 mins  x2-3 mins  Rev x2-3 mins  Rev x2-3 mins  Rev x 2-3 mins     HE ant taps x20          Retro walking CC 22. 5# x10 total  rev rev         Jump squats 2x10    Singles attempted     heisman's intro'd             2x10 B      Single leg burners x10 reg, w/ mini squat 2x10 B      Ther Act              stairs              gait              Sit<>stand                                                                                                                          Modalities              heat              Ice

## 2023-09-29 ENCOUNTER — OFFICE VISIT (OUTPATIENT)
Dept: PHYSICAL THERAPY | Facility: CLINIC | Age: 38
End: 2023-09-29
Payer: COMMERCIAL

## 2023-09-29 DIAGNOSIS — M25.562 PAIN IN LATERAL PORTION OF LEFT KNEE: Primary | ICD-10-CM

## 2023-09-29 PROCEDURE — 97110 THERAPEUTIC EXERCISES: CPT

## 2023-09-29 PROCEDURE — 97112 NEUROMUSCULAR REEDUCATION: CPT

## 2023-09-29 NOTE — PROGRESS NOTES
Daily Note     Today's date: 2023  Patient name: Yesica Gil  : 1985   MRN: [id number]  Referring provider: Self, Referral  Dx:   Encounter Diagnosis     ICD-10-CM    1. Pain in lateral portion of left knee  M25.562                      Subjective: Pt reports no new complaints. Notes some lateral knee soreness. Objective: requires cueing for proper knee positioning during drop landings - tends to perform w/ increased valgum w/o cueing. Assessment: Tolerated treatment well. Patient demonstrated fatigue post treatment and would benefit from continued PT. Does well w/ exercise progressions. Will benefit from progressive increases in resistance barring setback. Pt motivated by progress. Will benefit from [employer] specific work in coming days. Plan: Continue per plan of care. jiu Vouch specifics     POC expires Auth Status Total   Visits  Start date  Expiration date PT/OT + Visit Limit?  Co-Insurance    Not req     No                                         Dummy Auth Tracking  1 2 3 4 5 6   7 8 9 10 11 12   13 14 15 16 17 18   19 20 21 22 23 24   25 26 27 28 29 30   31 32 33 34 35 36         Precautions: standard     Date (Visit #)  IE (1)  (2)  (3)  (4)  (5)  (6)   (7)    Manual                DTM and TPR                                                                                  Exercise Diary           Ther Ex          Active w/u    upright post x 7 min lvl 4-5  upright pre 7 min lvl   upright pre 5-6 min lvl 3-4 rec pre 5 min lvl 6-7  Upright pre x 6-7 mins lvl 2-3  Pre x 6-7 mins   PROM  tested B   x2-3 mins    IASTM to L prox calf x5 mins  x2-3 mins    x5-6 mins x2-3 mins     x5-6 mins same    x2 mins     x7 min same   x2-3 mins     x8 mins x2-3 mins    x8 mins   HS/glute/piri stretch  tested   x3-4 min L HS and calf  x2-3 mins  x3-4 mins  x3-4 mins  x2-3 mins  x2-3 mins   QS, SLR, hip abd  QS x10  SAQ x10  SLR attempted   reg SLR x10, cw/ccw SLR and hip abd 2x10 each   rev         Active mobility          Leg press    135# x10  Singles 65# on L, 95# on R 3x8 NV                                             Neuro Re Ed          Bridging Reg x10, w/ ad sq x10, marching x10 attempted   rev  x20 w/ march  x10 reg, march 2x10  x20 singles  Singles x20 B  x20    TrA progressions              Squat training Sit<>stand elevated surface x5-10 total  reg 2x10 throughout, L HE x10-15    QING SS 2x10 B  2x10 reg    Lateral kossack TRX x20    Kickstand or single leg 2x10 B 26# goblet 2x8  Rev   26# goblet w/ lateral shifts x10-15 B     Dynamic forward lunge, lateral step 2x10  Reg x10    Jump 3x10 total  Jump fwd/bckwd 2x10     Reg x20    Jump squats x20   Hip Abd Progressions S/l clams x 10, w/ hip IR x10  rev  rev QING SS HE 2x10 reg w/o UE support , 18# KB 3 ways x 5 each QING splits x10 reg, 26# rack hold 2x10 B BW w/ HE x 10, 26# KB 2x10 B rev   Balance   Ant tib raises 2x15  Heel raises reg and soleus 2x10    Singles intro x20      x20 each 6" KB HF 18# lift 2x10 Rev  Slide board ant and lat lunges 2x10-15, second set w/ 26# KB  rev          Jogging progressions x5-6 mins   HEP and POC review  x8-10 mins total x2-3 mins  x2-3 mins  Rev x2-3 mins  Rev x2-3 mins  Rev x 2-3 mins Rev x2-3 mins     HE ant taps x20     Ant drop jumps 8" x10-15    Singles x10-15    Laterals x10-15, w/ hop up x15      Retro walking CC 22. 5# x10 total  rev rev          Jump squats 2x10    Singles attempted     heisman's intro'd             2x10 B       Single leg burners x10 reg, w/ mini squat 2x10 B       Ther Act               stairs               gait               Sit<>stand                                                                                                                                  Modalities              heat              Ice

## 2023-10-04 ENCOUNTER — EVALUATION (OUTPATIENT)
Dept: PHYSICAL THERAPY | Facility: CLINIC | Age: 38
End: 2023-10-04
Payer: COMMERCIAL

## 2023-10-04 DIAGNOSIS — M25.562 PAIN IN LATERAL PORTION OF LEFT KNEE: Primary | ICD-10-CM

## 2023-10-04 PROCEDURE — 97112 NEUROMUSCULAR REEDUCATION: CPT

## 2023-10-04 PROCEDURE — 97110 THERAPEUTIC EXERCISES: CPT

## 2023-10-04 NOTE — LETTER
2023    Karen Cantu MD  [facility] [address]    Patient: Joe Woodward   YOB: 1985   Date of Visit: 10/4/2023     Encounter Diagnosis     ICD-10-CM    1. Pain in lateral portion of left knee  M25.562           Dear Dr. Enmanuel Silva:    Thank you for your recent referral of Joe Woodward. Please review the attached evaluation summary from Chase's recent visit. Please verify that you agree with the plan of care by signing the attached order. If you have any questions or concerns, please do not hesitate to call. I sincerely appreciate the opportunity to share in the care of one of your patients and hope to have another opportunity to work with you in the near future. Sincerely,    Shani Oseguera, PT      Referring Provider:      I certify that I have read the below Plan of Care and certify the need for these services furnished under this plan of treatment while under my care. Karen Cantu MD  [address] [place] [address] Drive  Via Fax: [phone]          Progress Note     Today's date: 10/6/2023  Patient name: Joe Woodward  : 1985  MRN: [id number]  Referring provider: Karen Cantu MD  Dx:   Encounter Diagnosis     ICD-10-CM    1. Pain in lateral portion of left knee  M25.562           Start Time: 6552  Stop Time: 1230  Total time in clinic (min): 45 minutes    Subjective: Pt reports good progress since starting skilled PT. Notes that strength and mobility are returning. Still gets discomfort in distal posterior and lateral portion of L knee, fidel rthan before. Was able to do straight line running this weekend w/o pain. Encouraged by progress. Would like to transition to training some jiu jitsu specific motions. Functional update below:     Goals  Short term goals (3 weeks): ALL ACHIEVED   1) Pt will improve L knee AROM to WNL pain free. 2) Pt will improve B LE and core strength deficits by 1/3 grade MMT.    3) Pt will reports pain at worse <5/10. 4) Pt will initiate and progress HEP w/ special emphasis on functional core/hip/quad stability as it relates to knee function. Long term goals (6 weeks)   1) Pt will improve FOTO to at least 74.  - achieved   2) Pt will stand and ambulate community distances, to include stairs and change of surface, w/o deficit or pain. - achieved   3) Pt will perform two full weeks of light martial arts training w/o deficit related to knee. - progressed   4) Pt will be independent and compliant w/ HEP in order to maximize functional benefit of skilled PT following d/c. - progressed       Pain  Current pain ratin  At best pain ratin  At worst pain ratin-3  Location: L lateral knee, along L posterior knee  Quality: sharp, throbbing and tight  Relieving factors: ice, rest and change in position (sitting w/ leg extended is most comfortable)  Aggravating factors: standing, walking, stair climbing, lifting and running (functional transfers, stairs (up>down), walking >30 mins)  Progression: improved    Social Support  Steps to enter house: yes  Stairs in house: yes   Lives in: multiple-level home  Lives with: spouse and young children (two young kids)    Employment status: working (works for home, 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[employer])        Objective     Palpation     Additional Palpation Details  TTP on L lateral distal HS insertion, along L LCL and posterior aspect of L knee    -10/4: min TTP along lateral HS insertion, along fibular head    Neurological Testing     Sensation     Knee   Left Knee   Intact: light touch    Right Knee   Intact: light touch     Active Range of Motion   Left Knee   Flexion: 125 degrees with pain  Extension: 0 degrees     10: WNL    Right Knee   Flexion: 140 degrees   Extension: 0 degrees     Additional Active Range of Motion Details  Pain at 115, moves to 125 on L    10:  WNL w/o pain    Passive Range of Motion   Left Knee   Flexion: 130 degrees with pain  Extension: 0 degrees     10/4: WNL    Right Knee   Normal passive range of motion    Strength/Myotome Testing     Left Knee   Flexion: 4+  Extension: 4    Right Knee   Flexion: 5  Extension: 5    Additional Strength Details  LE Strength (R/L)    Hip  4+/5 throughout    Core Strength: at least 4/5     *Indicates pain      Tests     Additional Tests Details  (-) for any ligamentous laxity    -10/4: same    Ambulation     Ambulation: Level Surfaces     Additional Level Surfaces Ambulation Details  Relatively unremarkable over clinical distances    -10/4: no deficit     General Comments:      Knee Comments  Sit<>stand: UE support on LE w/ R side WS    -10/4: no deficit     BW squat: through 50% before R side WS and min L knee pain    -10/4: jump squat x10 through 50% range w/ equal WB and no pain    Stairs: TBA    -10/4: no deficit     SLS: painful on L    -10/4: no deficit    10/4:   Single leg 12" drops: x5 B w/o deficit ant or lat    3x single leg jump testing to be done in future. Assessment: Tolerated treatment well. Patient demonstrated fatigue post treatment and would benefit from continued PT. Pt has been re-assessed after 8 skilled therapy visits. He has made significant improvements in all areas of care to include strength, ROM, functional mobility, FOTO score, and self reports of pain. We will transition to more dynamic based exercises w/ focus on return to full jiu jitsu activity. Plan: Continue per plan of care. ground based jiu jitsu work    POC expires Auth Status Total   Visits  Start date  Expiration date PT/OT + Visit Limit?  Co-Insurance    Not req     No                                         Dummy Auth Tracking  1 2 3 4 5 6   7 8 9 10 11 12   13 14 15 16 17 18   19 20 21 22 23 24   25 26 27 28 29 30   31 32 33 34 35 36         Precautions: standard     Date (Visit #) 9/13 (3) 9/18 (4) 9/20 (5) 9/25 (6)  9/29 (7)  10/4 (8) PN    Manual              DTM and TPR Exercise Diary           Ther Ex          Active w/u  upright pre 7 min lvl   upright pre 5-6 min lvl 3-4 rec pre 5 min lvl 6-7  Upright pre x 6-7 mins lvl 2-3  Pre x 6-7 mins     PROM  x2-3 mins    x5-6 mins x2-3 mins     x5-6 mins same    x2 mins     x7 min same   x2-3 mins     x8 mins x2-3 mins    x8 mins x2 mins    IASTM and TPR to L side posterior knee/calf    HS/glute/piri stretch  x2-3 mins  x3-4 mins  x3-4 mins  x2-3 mins  x2-3 mins x2-3 mins     QS, SLR, hip abd  rev           Active mobility          Leg press  135# x10  Singles 65# on L, 95# on R 3x8 NV                                               Neuro Re Ed          Bridging  x20 w/ march  x10 reg, march 2x10  x20 singles  Singles x20 B  x20  rev    TrA progressions             Squat training  2x10 reg    Lateral kossack TRX x20    Kickstand or single leg 2x10 B 26# goblet 2x8  Rev   26# goblet w/ lateral shifts x10-15 B     Dynamic forward lunge, lateral step 2x10  Reg x10    Jump 3x10 total  Jump fwd/bckwd 2x10     Reg x20    Jump squats x20 Jump squats x20    Hip Abd Progressions  rev QING SS HE 2x10 reg w/o UE support , 18# KB 3 ways x 5 each QING splits x10 reg, 26# rack hold 2x10 B BW w/ HE x 10, 26# KB 2x10 B rev     Balance  x20      x20 each 6" KB HF 18# lift 2x10 Rev  Slide board ant and lat lunges 2x10-15, second set w/ 26# KB  rev          Jogging progressions x5-6 mins     HEP and POC review  x2-3 mins  Rev x2-3 mins  Rev x2-3 mins  Rev x 2-3 mins Rev x2-3 mins x5 mins plan review          Ant drop jumps 8" x10-15    Singles x10-15    Laterals x10-15, w/ hop up x15 12" x20 total ant and lat    8" w/ multi COD x3-4 mins     Retro walking CC 22. 5# x10 total  rev rev          Jump squats 2x10    Singles attempted     heisman's intro'd             2x10 B       Single leg burners x10 reg, w/ mini squat 2x10 B     Running progressions straight line, pick ups, 3 step stops, 45's - 6 mins    Ther Act stairs             gait             Sit<>stand                                                                                                                  Modalities              heat              Ice

## 2023-10-04 NOTE — PROGRESS NOTES
Progress Note     Today's date: 10/6/2023  Patient name: Lanre Mays  : 1985  MRN: [id number]  Referring provider: Yue Davenport MD  Dx:   Encounter Diagnosis     ICD-10-CM    1. Pain in lateral portion of left knee  M25.562           Start Time:   Stop Time: 1230  Total time in clinic (min): 45 minutes    Subjective: Pt reports good progress since starting skilled PT. Notes that strength and mobility are returning. Still gets discomfort in distal posterior and lateral portion of L knee, fidel han before. Was able to do straight line running this weekend w/o pain. Encouraged by progress. Would like to transition to training some jiu jitsu specific motions. Functional update below:     Goals  Short term goals (3 weeks): ALL ACHIEVED   1) Pt will improve L knee AROM to WNL pain free. 2) Pt will improve B LE and core strength deficits by 1/3 grade MMT. 3) Pt will reports pain at worse <5/10. 4) Pt will initiate and progress HEP w/ special emphasis on functional core/hip/quad stability as it relates to knee function. Long term goals (6 weeks)   1) Pt will improve FOTO to at least 74.  - achieved   2) Pt will stand and ambulate community distances, to include stairs and change of surface, w/o deficit or pain. - achieved   3) Pt will perform two full weeks of light martial arts training w/o deficit related to knee.  - progressed   4) Pt will be independent and compliant w/ HEP in order to maximize functional benefit of skilled PT following d/c. - progressed       Pain  Current pain ratin  At best pain ratin  At worst pain ratin-3  Location: L lateral knee, along L posterior knee  Quality: sharp, throbbing and tight  Relieving factors: ice, rest and change in position (sitting w/ leg extended is most comfortable)  Aggravating factors: standing, walking, stair climbing, lifting and running (functional transfers, stairs (up>down), walking >30 mins)  Progression: improved    Social Support  Steps to enter house: yes  Stairs in house: yes   Lives in: multiple-level home  Lives with: spouse and young children (two young kids)    Employment status: working (works for home, 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[employer]        Objective     Palpation     Additional Palpation Details  TTP on L lateral distal HS insertion, along L LCL and posterior aspect of L knee    -10/4: min TTP along lateral HS insertion, along fibular head    Neurological Testing     Sensation     Knee   Left Knee   Intact: light touch    Right Knee   Intact: light touch     Active Range of Motion   Left Knee   Flexion: 125 degrees with pain  Extension: 0 degrees     10/4: WNL    Right Knee   Flexion: 140 degrees   Extension: 0 degrees     Additional Active Range of Motion Details  Pain at 115, moves to 125 on L    10/4: WNL w/o pain    Passive Range of Motion   Left Knee   Flexion: 130 degrees with pain  Extension: 0 degrees     10/4: WNL    Right Knee   Normal passive range of motion    Strength/Myotome Testing     Left Knee   Flexion: 4+  Extension: 4    Right Knee   Flexion: 5  Extension: 5    Additional Strength Details  LE Strength (R/L)    Hip  4+/5 throughout    Core Strength: at least 4/5     *Indicates pain      Tests     Additional Tests Details  (-) for any ligamentous laxity    -10/4: same    Ambulation     Ambulation: Level Surfaces     Additional Level Surfaces Ambulation Details  Relatively unremarkable over clinical distances    -10/4: no deficit     General Comments:      Knee Comments  Sit<>stand: UE support on LE w/ R side WS    -10/4: no deficit     BW squat: through 50% before R side WS and min L knee pain    -10/4: jump squat x10 through 50% range w/ equal WB and no pain    Stairs: TBA    -10/4: no deficit     SLS: painful on L    -10/4: no deficit    10/4:   Single leg 12" drops: x5 B w/o deficit ant or lat    3x single leg jump testing to be done in future. Assessment: Tolerated treatment well.  Patient demonstrated fatigue post treatment and would benefit from continued PT. Pt has been re-assessed after 8 skilled therapy visits. He has made significant improvements in all areas of care to include strength, ROM, functional mobility, FOTO score, and self reports of pain. We will transition to more dynamic based exercises w/ focus on return to full jiu Sens[employer]u activity. Plan: Continue per plan of care. ground based jiu jiTenderTreeu work     POC expires Auth Status Total   Visits  Start date  Expiration date PT/OT + Visit Limit?  Co-Insurance    Not req     No                                         Dummy Auth Tracking  1 2 3 4 5 6   7 8 9 10 11 12   13 14 15 16 17 18   19 20 21 22 23 24   25 26 27 28 29 30   31 32 33 34 35 36         Precautions: standard     Date (Visit #) 9/13 (3) 9/18 (4) 9/20 (5) 9/25 (6)  9/29 (7)  10/4 (8) PN    Manual              DTM and TPR                                                                        Exercise Diary           Ther Ex          Active w/u  upright pre 7 min lvl   upright pre 5-6 min lvl 3-4 rec pre 5 min lvl 6-7  Upright pre x 6-7 mins lvl 2-3  Pre x 6-7 mins     PROM  x2-3 mins    x5-6 mins x2-3 mins     x5-6 mins same    x2 mins     x7 min same   x2-3 mins     x8 mins x2-3 mins    x8 mins x2 mins    IASTM and TPR to L side posterior knee/calf    HS/glute/piri stretch  x2-3 mins  x3-4 mins  x3-4 mins  x2-3 mins  x2-3 mins x2-3 mins     QS, SLR, hip abd  rev           Active mobility          Leg press  135# x10  Singles 65# on L, 95# on R 3x8 NV                                               Neuro Re Ed          Bridging  x20 w/ march  x10 reg, march 2x10  x20 singles  Singles x20 B  x20  rev    TrA progressions             Squat training  2x10 reg    Lateral kossack TRX x20    Kickstand or single leg 2x10 B 26# goblet 2x8  Rev   26# goblet w/ lateral shifts x10-15 B     Dynamic forward lunge, lateral step 2x10  Reg x10    Jump 3x10 total  Jump fwd/bckwd 2x10     Reg x20    Jump squats x20 Jump squats x20    Hip Abd Progressions  rev QING SS HE 2x10 reg w/o UE support , 18# KB 3 ways x 5 each QING splits x10 reg, 26# rack hold 2x10 B BW w/ HE x 10, 26# KB 2x10 B rev     Balance  x20      x20 each 6" KB HF 18# lift 2x10 Rev  Slide board ant and lat lunges 2x10-15, second set w/ 26# KB  rev          Jogging progressions x5-6 mins     HEP and POC review  x2-3 mins  Rev x2-3 mins  Rev x2-3 mins  Rev x 2-3 mins Rev x2-3 mins x5 mins plan review          Ant drop jumps 8" x10-15    Singles x10-15    Laterals x10-15, w/ hop up x15 12" x20 total ant and lat    8" w/ multi COD x3-4 mins     Retro walking CC 22. 5# x10 total  rev rev          Jump squats 2x10    Singles attempted     heisman's intro'd             2x10 B       Single leg burners x10 reg, w/ mini squat 2x10 B     Running progressions straight line, pick ups, 3 step stops, 45's - 6 mins    Ther Act             stairs             gait             Sit<>stand                                                                                                                  Modalities              heat              Ice

## 2023-10-06 ENCOUNTER — OFFICE VISIT (OUTPATIENT)
Dept: PHYSICAL THERAPY | Facility: CLINIC | Age: 38
End: 2023-10-06
Payer: COMMERCIAL

## 2023-10-06 DIAGNOSIS — M25.562 PAIN IN LATERAL PORTION OF LEFT KNEE: Primary | ICD-10-CM

## 2023-10-06 PROCEDURE — 97110 THERAPEUTIC EXERCISES: CPT

## 2023-10-06 PROCEDURE — 97112 NEUROMUSCULAR REEDUCATION: CPT

## 2023-10-06 NOTE — PROGRESS NOTES
Daily Note     Today's date: 10/6/2023  Patient name: Shari Lainez  : 1985  MRN: [id number]  Referring provider: Jeff Raya MD  Dx:   Encounter Diagnosis     ICD-10-CM    1. Pain in lateral portion of left knee  M25.562                      Subjective: Pt reports no new complaints. Went to [employer] class last night w/ good results, had some knee soreness but felt good overall. Encouraged by progress. Objective: requires cueing for proper hip/knee positioning during all ground activities, corrects and is able to perform relatively pain free w/ increased reps. Assessment: Tolerated treatment well. Patient demonstrated fatigue post treatment and would benefit from continued PT. Pt does well w/ exercise progressions, fatigue but no increase in pain by end of session. Excellent tolerance to strength activities in ground based positions today. Will be appropriate for more advanced progressions pending MD clearance next week. Pt encouraged w/ progress. Plan: Continue per plan of care. check MD note. POC expires Auth Status Total   Visits  Start date  Expiration date PT/OT + Visit Limit?  Co-Insurance    Not req     No                                         Dummy Auth Tracking  1 2 3 4 5 6   7 8 9 10 11 12   13 14 15 16 17 18   19 20 21 22 23 24   25 26 27 28 29 30   31 32 33 34 35 36         Precautions: standard     Date (Visit #)  (3)  (4)  (5)  (6)   (7)  10/4 (8) PN 10/6 (9)   Manual              DTM and TPR                                                                        Exercise Diary           Ther Ex          Active w/u  upright pre 7 min lvl   upright pre 5-6 min lvl 3-4 rec pre 5 min lvl 6-7  Upright pre x 6-7 mins lvl 2-3  Pre x 6-7 mins  6 min pre lvl 3-4    PROM  x2-3 mins    x5-6 mins x2-3 mins     x5-6 mins same    x2 mins     x7 min same   x2-3 mins     x8 mins x2-3 mins    x8 mins x2 mins    IASTM and TPR to L side posterior knee/calf x2 mins    x10 mins   HS/glute/piri stretch  x2-3 mins  x3-4 mins  x3-4 mins  x2-3 mins  x2-3 mins x2-3 mins     QS, SLR, hip abd  rev           Active mobility          Leg press  135# x10  Singles 65# on L, 95# on R 3x8 NV                        Ant tib raises, heel raises x30 total each                       Neuro Re Ed          Bridging  x20 w/ march  x10 reg, march 2x10  x20 singles  Singles x20 B  x20  rev    TrA progressions             Squat training  2x10 reg    Lateral kossack TRX x20    Kickstand or single leg 2x10 B 26# goblet 2x8  Rev   26# goblet w/ lateral shifts x10-15 B     Dynamic forward lunge, lateral step 2x10  Reg x10    Jump 3x10 total  Jump fwd/bckwd 2x10     Reg x20    Jump squats x20 Jump squats x20 rev   Hip Abd Progressions  rev QING SS HE 2x10 reg w/o UE support , 18# KB 3 ways x 5 each QING splits x10 reg, 26# rack hold 2x10 B BW w/ HE x 10, 26# KB 2x10 B rev     Balance  x20      x20 each 6" KB HF 18# lift 2x10 Rev  Slide board ant and lat lunges 2x10-15, second set w/ 26# KB  rev          Jogging progressions x5-6 mins     HEP and POC review  x2-3 mins  Rev x2-3 mins  Rev x2-3 mins  Rev x 2-3 mins Rev x2-3 mins x5 mins plan review  x3-4 mins         Ant drop jumps 8" x10-15    Singles x10-15    Laterals x10-15, w/ hop up x15 12" x20 total ant and lat    8" w/ multi COD x3-4 mins     Retro walking CC 22. 5# x10 total  rev rev    Ground based jiu jitsu walk through x 20 mins      Jump squats 2x10    Singles attempted     heisman's intro'd             2x10 B       Single leg burners x10 reg, w/ mini squat 2x10 B     Running progressions straight line, pick ups, 3 step stops, 45's - 6 mins    Ther Act             stairs             gait             Sit<>stand                                                                                                                  Modalities              heat              Ice

## 2023-10-13 ENCOUNTER — OFFICE VISIT (OUTPATIENT)
Dept: PHYSICAL THERAPY | Facility: CLINIC | Age: 38
End: 2023-10-13
Payer: COMMERCIAL

## 2023-10-13 DIAGNOSIS — M25.562 PAIN IN LATERAL PORTION OF LEFT KNEE: Primary | ICD-10-CM

## 2023-10-13 PROCEDURE — 97112 NEUROMUSCULAR REEDUCATION: CPT

## 2023-10-13 PROCEDURE — 97110 THERAPEUTIC EXERCISES: CPT

## 2023-10-13 NOTE — PROGRESS NOTES
Daily Note     Today's date: 10/13/2023  Patient name: Samira Christie  : 1985  MRN: [id number]  Referring provider: Self, Referral  Dx:   Encounter Diagnosis     ICD-10-CM    1. Pain in lateral portion of left knee  M25.562                      Subjective: Pt reports no new complaints. Had MD follow up, was cleared for return to [facility]. Still feels slightly shaky w/ motions where he has to fully accept weight onto L knee. Objective: requires cueing for landing posture and proper WS during single leg drop landings to catch and throw - mod correction, fatigues quickly. Assessment: Tolerated treatment well. Patient demonstrated fatigue post treatment, exhibited good technique with therapeutic exercises, and would benefit from continued PT. Continues to progress well. Will benefit from more aggressive dynamic challenges in an effort to maximize stability through L LE w/ all sporting activities. Pt encouraged by progress. Plan to continue for ~2 weeks before d/c to independent HEP. Plan: Continue per plan of care. Dynamic sled progressions, MADE     POC expires Auth Status Total   Visits  Start date  Expiration date PT/OT + Visit Limit?  Co-Insurance    Not req     No                                         Dummy Auth Tracking  1 2 3 4 5 6   7 8 9 10 11 12   13 14 15 16 17 18   19 20 21 22 23 24   25 26 27 28 29 30   31 32 33 34 35 36         Precautions: standard     Date (Visit #)  (3)  (4)  (5)  (6)   (7)  10/4 (8) PN 10/6 (9) 10/13 (7)   Manual               DTM and TPR                                                                             Exercise Diary            Ther Ex           Active w/u  upright pre 7 min lvl   upright pre 5-6 min lvl 3-4 rec pre 5 min lvl 6-7  Upright pre x 6-7 mins lvl 2-3  Pre x 6-7 mins  6 min pre lvl 3-4  no   PROM  x2-3 mins    x5-6 mins x2-3 mins     x5-6 mins same    x2 mins     x7 min same   x2-3 mins     x8 mins x2-3 mins    x8 mins x2 mins    IASTM and TPR to L side posterior knee/calf x2 mins    x10 mins X3 mins    X10 mins    HS/glute/piri stretch  x2-3 mins  x3-4 mins  x3-4 mins  x2-3 mins  x2-3 mins x2-3 mins   X2-3 mins    QS, SLR, hip abd  rev            Active mobility           Leg press  135# x10  Singles 65# on L, 95# on R 3x8 NV                          Ant tib raises, heel raises x30 total each rev                         Neuro Re Ed           Bridging  x20 w/ march  x10 reg, march 2x10  x20 singles  Singles x20 B  x20  rev  rev   TrA progressions              Squat training  2x10 reg    Lateral kossack TRX x20    Kickstand or single leg 2x10 B 26# goblet 2x8  Rev   26# goblet w/ lateral shifts x10-15 B     Dynamic forward lunge, lateral step 2x10  Reg x10    Jump 3x10 total  Jump fwd/bckwd 2x10     Reg x20    Jump squats x20 Jump squats x20 rev 12" drops ant double and single, w/ 10# med ball throw, w/ lat bound and land - 10 mins   Hip Abd Progressions  rev QING SS HE 2x10 reg w/o UE support , 18# KB 3 ways x 5 each QING splits x10 reg, 26# rack hold 2x10 B BW w/ HE x 10, 26# KB 2x10 B rev   QING splits HE full range 3x10   Balance  x20      x20 each 6" KB HF 18# lift 2x10 Rev  Slide board ant and lat lunges 2x10-15, second set w/ 26# KB  rev   CC lateral hops 17.5# or black spots cord w/ pert training x 8 mins         Jogging progressions x5-6 mins      HEP and POC review  x2-3 mins  Rev x2-3 mins  Rev x2-3 mins  Rev x 2-3 mins Rev x2-3 mins x5 mins plan review  x3-4 mins  Rev x 2-3 mins        Ant drop jumps 8" x10-15    Singles x10-15    Laterals x10-15, w/ hop up x15 12" x20 total ant and lat    8" w/ multi COD x3-4 mins      Retro walking CC 22. 5# x10 total  rev rev    Ground based jiu jitsu walk through x 20 mins       Jump squats 2x10    Singles attempted     noemy's intro'd             2x10 B        Single leg burners x10 reg, w/ mini squat 2x10 B     Running progressions straight line, pick ups, 3 step stops, 45's - 6 mins     Ther Act              stairs              gait              Sit<>stand                                                                                                                          Modalities              heat              Ice

## 2023-10-16 ENCOUNTER — OFFICE VISIT (OUTPATIENT)
Dept: PHYSICAL THERAPY | Facility: CLINIC | Age: 38
End: 2023-10-16
Payer: COMMERCIAL

## 2023-10-16 DIAGNOSIS — M25.562 PAIN IN LATERAL PORTION OF LEFT KNEE: Primary | ICD-10-CM

## 2023-10-16 PROCEDURE — 97110 THERAPEUTIC EXERCISES: CPT

## 2023-10-16 PROCEDURE — 97112 NEUROMUSCULAR REEDUCATION: CPT

## 2023-10-16 NOTE — PROGRESS NOTES
Daily Note     Today's date: 10/16/2023  Patient name: Yesica Gil  : 1985  MRN: [id number]  Referring provider: Self, Referral  Dx:   Encounter Diagnosis     ICD-10-CM    1. Pain in lateral portion of left knee  M25.562                      Subjective: Pt reports continued progress, felt good after last visit. Still has posterior knee and HS soreness, but improving overall. Objective: requires cueing for symmetry and proper force w/ jumping landing progressions, especially L LE as compared to R. Corrects for landing deviations but fatigues quickly L>R. Assessment: Tolerated treatment well. Patient demonstrated fatigue post treatment and would benefit from continued PT. Does well w/ exercise progressions today. Added dynamic HS challenge, in addition to single and double leg jump challenges. Will increase intensity as sx allow, focus on techniques that can be replicated at home or in [facility]. Plan: Continue per plan of care. Jump progressions, sled work - try to get to gym      POC expires Auth Status Total   Visits  Start date  Expiration date PT/OT + Visit Limit?  Co-Insurance    Not req     No                                         Dummy Auth Tracking  1 2 3 4 5 6   7 8 9 10 11 12   13 14 15 16 17 18   19 20 21 22 23 24   25 26 27 28 29 30   31 32 33 34 35 36         Precautions: standard     Date (Visit #)  (3)  (4)  (5)  (6)   (7)  10/4 (8) PN 10/6 (9) 10/13 (7) 10/16 (8)    Manual                DTM and TPR                                                                                  Exercise Diary             Ther Ex            Active w/u  upright pre 7 min lvl   upright pre 5-6 min lvl 3-4 rec pre 5 min lvl 6-7  Upright pre x 6-7 mins lvl 2-3  Pre x 6-7 mins  6 min pre lvl 3-4  no X6 mins pre    PROM  x2-3 mins    x5-6 mins x2-3 mins     x5-6 mins same    x2 mins     x7 min same   x2-3 mins     x8 mins x2-3 mins    x8 mins x2 mins    IASTM and TPR to L side posterior knee/calf x2 mins    x10 mins X3 mins    X10 mins  X2-3 mins    X10 min same   HS/glute/piri stretch  x2-3 mins  x3-4 mins  x3-4 mins  x2-3 mins  x2-3 mins x2-3 mins   X2-3 mins  X2-3 mins    QS, SLR, hip abd  rev             Active mobility            Leg press  135# x10  Singles 65# on L, 95# on R 3x8 NV                            Ant tib raises, heel raises x30 total each rev rev                           Neuro Re Ed            Bridging  x20 w/ march  x10 reg, march 2x10  x20 singles  Singles x20 B  x20  rev  rev 12" bridge HS switch x20 B    Bridge HS slider curls x20   TrA progressions               Squat training  2x10 reg    Lateral kossack TRX x20    Kickstand or single leg 2x10 B 26# goblet 2x8  Rev   26# goblet w/ lateral shifts x10-15 B     Dynamic forward lunge, lateral step 2x10  Reg x10    Jump 3x10 total  Jump fwd/bckwd 2x10     Reg x20    Jump squats x20 Jump squats x20 rev 12" drops ant double and single, w/ 10# med ball throw, w/ lat bound and land - 10 mins 12" single and double drops, w/ ball toss, w/ jump - 10 mins total   Hip Abd Progressions  rev QING SS HE 2x10 reg w/o UE support , 18# KB 3 ways x 5 each QING splits x10 reg, 26# rack hold 2x10 B BW w/ HE x 10, 26# KB 2x10 B rev   QING splits HE full range 3x10    Balance  x20      x20 each 6" KB HF 18# lift 2x10 Rev  Slide board ant and lat lunges 2x10-15, second set w/ 26# KB  rev   CC lateral hops 17.5# or black spots cord w/ pert training x 8 mins  rev        Jogging progressions x5-6 mins       HEP and POC review  x2-3 mins  Rev x2-3 mins  Rev x2-3 mins  Rev x 2-3 mins Rev x2-3 mins x5 mins plan review  x3-4 mins  Rev x 2-3 mins Rev x 2-3 mins        Ant drop jumps 8" x10-15    Singles x10-15    Laterals x10-15, w/ hop up x15 12" x20 total ant and lat    8" w/ multi COD x3-4 mins       Retro walking CC 22. 5# x10 total  rev rev    Ground based alexu mckenna walk through x 20 mins  rev      Jump squats 2x10    Singles attempted noemy's intro'd             2x10 B         Single leg burners x10 reg, w/ mini squat 2x10 B     Running progressions straight line, pick ups, 3 step stops, 45's - 6 mins      Ther Act               stairs               gait               Sit<>stand                                                                                                                                  Modalities              heat              Ice

## 2023-10-20 ENCOUNTER — OFFICE VISIT (OUTPATIENT)
Dept: PHYSICAL THERAPY | Facility: CLINIC | Age: 38
End: 2023-10-20
Payer: COMMERCIAL

## 2023-10-20 DIAGNOSIS — M25.562 PAIN IN LATERAL PORTION OF LEFT KNEE: Primary | ICD-10-CM

## 2023-10-20 PROCEDURE — 97112 NEUROMUSCULAR REEDUCATION: CPT

## 2023-10-20 PROCEDURE — 97110 THERAPEUTIC EXERCISES: CPT

## 2023-10-20 PROCEDURE — 97140 MANUAL THERAPY 1/> REGIONS: CPT

## 2023-10-20 NOTE — PROGRESS NOTES
Daily Note     Today's date: 10/20/2023  Patient name: Tamiko Grayson  : 1985  MRN: [id number]  Referring provider: Self, Referral  Dx:   Encounter Diagnosis     ICD-10-CM    1. Pain in lateral portion of left knee  M25.562                      Subjective: Pt reports no new complaints, reports that pressing motions w/ leg from a closed in position to away from body is still slightly touchy. Objective: medial HS tissue tension noted w/ IASTM on L side today - good reduction. Assessment: Tolerated treatment well. Patient demonstrated fatigue post treatment and would benefit from continued PT. Does well w/ exercise progressions. Fatigue but no increase in pain by end of session. Will benefit from more aggressive strength challenges barring setback, focus on techniques that relate to jiu [employer] activities. Plan: Continue per plan of care. Mobility progressions for HS and calves as able, loaded single leg work     POC expires Auth Status Total   Visits  Start date  Expiration date PT/OT + Visit Limit?  Co-Insurance    Not req     No                                         Dummy Auth Tracking  1 2 3 4 5 6   7 8 9 10 11 12   13 14 15 16 17 18   19 20 21 22 23 24   25 26 27 28 29 30   31 32 33 34 35 36         Precautions: standard     Date (Visit #)  (3)  (4)  (5)  (6)   (7)  10/4 (8) PN 10/6 (9) 10/13 (7) 10/16 (8)  10/20 (9)   Manual                 DTM and TPR                                                                                       Exercise Diary              Ther Ex             Active w/u  upright pre 7 min lvl   upright pre 5-6 min lvl 3-4 rec pre 5 min lvl 6-7  Upright pre x 6-7 mins lvl 2-3  Pre x 6-7 mins  6 min pre lvl 3-4  no X6 mins pre  X7 min pre w/u   PROM  x2-3 mins    x5-6 mins x2-3 mins     x5-6 mins same    x2 mins     x7 min same   x2-3 mins     x8 mins x2-3 mins    x8 mins x2 mins    IASTM and TPR to L side posterior knee/calf x2 mins    x10 mins X3 mins    X10 mins  X2-3 mins    X10 min same X5-6 mins    X12 mins    HS/glute/piri stretch  x2-3 mins  x3-4 mins  x3-4 mins  x2-3 mins  x2-3 mins x2-3 mins   X2-3 mins  X2-3 mins  CR x 5-6 mins    QS, SLR, hip abd  rev              Active mobility             Leg press  135# x10  Singles 65# on L, 95# on R 3x8 NV                              Ant tib raises, heel raises x30 total each rev rev                              Neuro Re Ed             Bridging  x20 w/ march  x10 reg, march 2x10  x20 singles  Singles x20 B  x20  rev  rev 12" bridge HS switch x20 B    Bridge HS slider curls x20 rev   TrA progressions                Squat training  2x10 reg    Lateral kossack TRX x20    Kickstand or single leg 2x10 B 26# goblet 2x8  Rev   26# goblet w/ lateral shifts x10-15 B     Dynamic forward lunge, lateral step 2x10  Reg x10    Jump 3x10 total  Jump fwd/bckwd 2x10     Reg x20    Jump squats x20 Jump squats x20 rev 12" drops ant double and single, w/ 10# med ball throw, w/ lat bound and land - 10 mins 12" single and double drops, w/ ball toss, w/ jump - 10 mins total Jiu jitsu leg training x 10 min w/ various holds and presses    Hip Abd Progressions  rev QING SS HE 2x10 reg w/o UE support , 18# KB 3 ways x 5 each QING splits x10 reg, 26# rack hold 2x10 B BW w/ HE x 10, 26# KB 2x10 B rev   QING splits HE full range 3x10  [place] HS curl 3x8-10    Standing lunge 2x10   Balance  x20      x20 each 6" KB HF 18# lift 2x10 Rev  Slide board ant and lat lunges 2x10-15, second set w/ 26# KB  rev   CC lateral hops 17.5# or black spots cord w/ pert training x 8 mins  rev         Jogging progressions x5-6 mins     Assisted 90/90 hip lift x20 TRX   HEP and POC review  x2-3 mins  Rev x2-3 mins  Rev x2-3 mins  Rev x 2-3 mins Rev x2-3 mins x5 mins plan review  x3-4 mins  Rev x 2-3 mins Rev x 2-3 mins Rev x 2-3 mins        Ant drop jumps 8" x10-15    Singles x10-15    Laterals x10-15, w/ hop up x15 12" x20 total ant and lat    8" w/ multi COD Size Of Lesion In Cm (Optional): 0 x3-4 mins        Retro walking CC 22. 5# x10 total  rev rev    Ground based alexu luchou walk through x 20 mins  rev       Jump squats 2x10    Singles attempted     heisman's intro'd             2x10 B          Single leg burners x10 reg, w/ mini squat 2x10 B     Running progressions straight line, pick ups, 3 step stops, 45's - 6 mins       Ther Act                stairs                gait                Sit<>stand                                                                                                                                          Modalities              heat              Ice Body Location Override (Optional - Billing Will Still Be Based On Selected Body Map Location If Applicable): left earlobe Detail Level: Detailed Body Location Override (Optional - Billing Will Still Be Based On Selected Body Map Location If Applicable): right lateral cheek Body Location Override (Optional - Billing Will Still Be Based On Selected Body Map Location If Applicable): left distal dorsal forearm

## 2023-10-27 ENCOUNTER — APPOINTMENT (OUTPATIENT)
Dept: PHYSICAL THERAPY | Facility: CLINIC | Age: 38
End: 2023-10-27
Payer: COMMERCIAL

## 2023-12-27 ENCOUNTER — OFFICE VISIT (OUTPATIENT)
Dept: URGENT CARE | Facility: CLINIC | Age: 38
End: 2023-12-27
Payer: COMMERCIAL

## 2023-12-27 ENCOUNTER — APPOINTMENT (OUTPATIENT)
Dept: RADIOLOGY | Facility: CLINIC | Age: 38
End: 2023-12-27
Payer: COMMERCIAL

## 2023-12-27 VITALS
HEART RATE: 66 BPM | BODY MASS INDEX: 24.67 KG/M2 | RESPIRATION RATE: 16 BRPM | WEIGHT: 187 LBS | TEMPERATURE: 97.2 F | OXYGEN SATURATION: 97 %

## 2023-12-27 DIAGNOSIS — S99.922A FOOT INJURY, LEFT, INITIAL ENCOUNTER: ICD-10-CM

## 2023-12-27 DIAGNOSIS — S92.355A NONDISPLACED FRACTURE OF FIFTH METATARSAL BONE, LEFT FOOT, INITIAL ENCOUNTER FOR CLOSED FRACTURE: Primary | ICD-10-CM

## 2023-12-27 PROCEDURE — 73630 X-RAY EXAM OF FOOT: CPT

## 2023-12-27 PROCEDURE — 99203 OFFICE O/P NEW LOW 30 MIN: CPT | Performed by: PHYSICIAN ASSISTANT

## 2023-12-27 NOTE — PROGRESS NOTES
[facility]        NAME: Chase Valdes is a 38 y.o. male  : 1985    MRN: [id number]  DATE: 2023  TIME: 4:47 PM    Assessment and Plan   Nondisplaced fracture of fifth metatarsal bone, left foot, initial encounter for closed fracture [S92.355A]  1. Nondisplaced fracture of fifth metatarsal bone, left foot, initial encounter for closed fracture  XR foot 3+ vw left    Boot    Ambulatory Referral to Podiatry        XR significant for left pseudo mendez fracture. Continue NSAIDs/tylenol, ice, rest, elevation. Placed in boot and referred to ortho. Discussed strict return to care precautions as well as red flag symptoms which should prompt immediate ED referral. Pt verbalized understanding and is in agreement with plan.  Please follow up with your primary care provider within the next week. Please remember that your visit today was with an urgent care provider and should not replace follow up with your primary care provider for chronic medical issues or annual physicals.       Patient Instructions       Follow up with PCP in 3-5 days.  Proceed to  ER if symptoms worsen.    Chief Complaint     Chief Complaint   Patient presents with    Foot Injury     Pt presents with injury of the left foot on Friday r/t being hit in lateral aspect of foot by a hockey puck         History of Present Illness       Patient is a 38-year-old male with no reported PMH presenting with left foot pain x 5 days.  States while playing hockey a puck hit the lateral side of his left foot and he has been in pain since then.  Tried icing, ibuprofen, and Tylenol with minimal relief.  No numbness or tingling.  Has been fully weightbearing since the injury.  No prior injury of either foot.        Review of Systems   Review of Systems   Constitutional:  Negative for chills and fever.   Respiratory:  Negative for shortness of breath.    Cardiovascular:  Negative for chest pain.   Gastrointestinal:  Negative for nausea and  vomiting.   Musculoskeletal:  Positive for arthralgias. Negative for back pain, gait problem, joint swelling, myalgias and neck pain.   Skin:  Negative for color change and wound.   Neurological:  Negative for weakness and numbness.         Current Medications       Current Outpatient Medications:     mupirocin (BACTROBAN) 2 % ointment, Apply topically 3 (three) times a day (Patient not taking: Reported on 12/27/2023), Disp: 22 g, Rfl: 0    triamcinolone (KENALOG) 0.1 % cream, Apply topically 2 (two) times a day (Patient not taking: Reported on 12/27/2023), Disp: 30 g, Rfl: 0    Current Allergies     Allergies as of 12/27/2023 - Reviewed 12/27/2023   Allergen Reaction Noted    Fluconazole Fever 07/29/2023    Vancomycin Vomiting 06/30/2023            The following portions of the patient's history were reviewed and updated as appropriate: allergies, current medications, past family history, past medical history, past social history, past surgical history and problem list.     History reviewed. No pertinent past medical history.    Past Surgical History:   Procedure Laterality Date    APPENDECTOMY      HERNIA REPAIR      KNEE SURGERY Right        History reviewed. No pertinent family history.      Medications have been verified.        Objective   Pulse 66   Temp (!) 97.2 °F (36.2 °C)   Resp 16   Wt 84.8 kg (187 lb)   SpO2 97%   BMI 24.67 kg/m²        Physical Exam     Physical Exam  Vitals and nursing note reviewed.   Constitutional:       General: He is not in acute distress.     Appearance: Normal appearance. He is not ill-appearing.   HENT:      Head: Normocephalic and atraumatic.   Cardiovascular:      Rate and Rhythm: Normal rate.   Pulmonary:      Effort: Pulmonary effort is normal. No respiratory distress.   Musculoskeletal:      Left ankle: Normal. No swelling. No tenderness. Normal range of motion.      Right foot: Normal.      Left foot: Normal range of motion and normal capillary refill. Swelling  (Diffuse), tenderness and bony tenderness (Fifth metatarsal) present. No crepitus. Normal pulse.   Skin:     General: Skin is warm and dry.      Capillary Refill: Capillary refill takes less than 2 seconds.   Neurological:      Mental Status: He is alert and oriented to person, place, and time.   Psychiatric:         Behavior: Behavior normal.

## 2023-12-29 NOTE — TELEPHONE ENCOUNTER
Hello,  Please advise if the following patient can be forced onto the schedule:    Patient: Chase Valdes    : 1985    MRN: [id number]    Call back #: [phone]    Insurance: [employer]    Reason for appointment: Nondisplaced fracture of fifth metatarsal bone, left foot     Requested doctor/location: Dr. Lachman/ [facility]      Thank you.

## 2024-01-03 ENCOUNTER — OFFICE VISIT (OUTPATIENT)
Dept: OBGYN CLINIC | Facility: CLINIC | Age: 39
End: 2024-01-03
Payer: COMMERCIAL

## 2024-01-03 VITALS — HEIGHT: 73 IN | WEIGHT: 187 LBS | BODY MASS INDEX: 24.78 KG/M2

## 2024-01-03 DIAGNOSIS — S92.355A NONDISPLACED FRACTURE OF FIFTH METATARSAL BONE, LEFT FOOT, INITIAL ENCOUNTER FOR CLOSED FRACTURE: ICD-10-CM

## 2024-01-03 PROCEDURE — 28470 CLTX METATARSAL FX WO MNP EA: CPT | Performed by: ORTHOPAEDIC SURGERY

## 2024-01-03 PROCEDURE — 99204 OFFICE O/P NEW MOD 45 MIN: CPT | Performed by: ORTHOPAEDIC SURGERY

## 2024-01-03 NOTE — PATIENT INSTRUCTIONS
Weightbearing as tolerated in CAM boot  Do not need to wear the boot for sleep or showering but should wear it any time you are walking on it.    Recommend taking the following supplements: Vitamin D3- 4000 units per day and Calcium 1200 mg per day. This will help with bone healing.     Avoid NSAIDs like Motrin/Aleve/Ibuprofen.  Avoid steroids/nicotine products/ rheumatoid medications like DMARDs if possible.    Aspirin 81mg for blood clot prevention.      Knee high compression stocking 20-30mm HG of pressure

## 2024-01-03 NOTE — PROGRESS NOTES
James R Lachman, M.D.  Attending, Orthopaedic Surgery  Foot and Ankle  [facility]      ORTHOPAEDIC FOOT AND ANKLE CLINIC VISIT     Assessment:     Encounter Diagnosis   Name Primary?    Nondisplaced fracture of fifth metatarsal bone, left foot, initial encounter for closed fracture             Plan:   The patient verbalized understanding of exam findings and treatment plan. We engaged in the shared decision-making process and treatment options were discussed at length with the patient. Surgical and conservative management discussed today along with risks and benefits.  He has a zone 1 fifth metatarsal fracture that is minimally displaced. This is amenable to nonoperative treatment  WBAT in a CAM boot  Vit D and Ca  Aspirin for DVT ppx  Return to clinic in 6 weeks for instructions to wean boot.    Fracture / Dislocation Treatment    Date/Time: 1/3/2024 8:00 AM    Performed by: James R Lachman, MD  Authorized by: James R Lachman, MD    Patient Location:  Bedside  Universal Protocol:  Consent given by: patient  Patient understanding: patient states understanding of the procedure being performed  Site marked: the operative site was marked  Patient identity confirmed: verbally with patient    Injury location:  Foot  Location details:  Left foot  Injury type:  Fracture  Fracture type: fifth metatarsal    Distal perfusion: normal    Range of motion: reduced    Local anesthesia used?: No    General anesthesia used?: No    Manipulation performed?: No    Immobilization:  Brace  Splint type:  Short leg  Neurovascular status: Neurovascularly intact    Distal perfusion: normal    Range of motion: unchanged    Patient tolerance:  Patient tolerated the procedure well with no immediate complications          History of Present Illness:   Chief Complaint:   Chief Complaint   Patient presents with    Left Foot - Fracture     Chase Valdes is a 38 y.o. male who is being seen for left 5th metatarsal fracture.  Injury sustained and seen at urgent care 12/27/23.  Pain is localized at fracture site with minimal radiating and described as sharp and severe. Patient denies numbness, tingling or radicular pain.  Denies history of neuropathy.  Patient does not smoke, does not have diabetes and does not take blood thinners.  Patient denies family history of anesthesia complications and has not had any complications with anesthesia.     Pain/symptom timing:  Worse during the day when active  Pain/symptom context:  Worse with activites and work  Pain/symptom modifying factors:  Rest makes better, activities make worse  Pain/symptom associated signs/symptoms: none    Prior treatment   NSAIDsYes   Injections No   Bracing/Orthotics Yes    Physical Therapy No     Orthopedic Surgical History:   See below    Past Medical, Surgical and Social History:  Past Medical History:  has no past medical history on file.  Problem List: does not have any pertinent problems on file.  Past Surgical History:  has a past surgical history that includes Hernia repair; Appendectomy; and Knee surgery (Right).  Family History: family history is not on file.  Social History:  reports that he has never smoked. He has never been exposed to tobacco smoke. He has never used smokeless tobacco. He reports that he does not drink alcohol. No history on file for drug use.  Current Medications: has a current medication list which includes the following prescription(s): mupirocin and triamcinolone.  Allergies: is allergic to fluconazole and vancomycin.     Review of Systems:  General- denies fever/chills  HEENT- denies hearing loss or sore throat  Eyes- denies eye pain or visual disturbances, denies red eyes  Respiratory- denies cough or SOB  Cardio- denies chest pain or palpitations  GI- denies abdominal pain  Endocrine- denies urinary frequency  Urinary- denies pain with urination  Musculoskeletal- Negative except noted above  Skin- denies rashes or  wounds  Neurological- denies dizziness or headache  Psychiatric- denies anxiety or difficulty concentrating    Physical Exam:   There were no vitals taken for this visit.  General/Constitutional: No apparent distress: well-nourished and well developed.  Eyes: normal ocular motion  Cardio: RRR, Normal S1S2, No m/r/g  Lymphatic: No appreciable lymphadenopathy  Respiratory: Non-labored breathing, CTA b/l no w/c/r  Vascular: No edema, swelling or tenderness, except as noted in detailed exam.  Integumentary: No impressive skin lesions present, except as noted in detailed exam.  Neuro: No ataxia or tremors noted  Psych: Normal mood and affect, oriented to person, place and time. Appropriate affect.  Musculoskeletal: Normal, except as noted in detailed exam and in HPI.    Examination    Left    Gait Normal   Musculoskeletal Tender to palpation at fracture site    Skin Normal.      Nails Normal    Range of Motion  20 degrees dorsiflexion, 30 degrees plantarflexion  Subtalar motion: normal    Stability Stable    Muscle Strength 5/5 tibialis anterior  5/5 gastrocnemius-soleus  5/5 posterior tibialis  5/5 peroneal/eversion strength  5/5 EHL  5/5 FHL    Neurologic Normal    Sensation Intact to light touch throughout sural, saphenous, superficial peroneal, deep peroneal and medial/lateral plantar nerve distributions.  Milligan-Vielka 5.07 filament (10g) testing deferred.    Cardiovascular Brisk capillary refill < 2 seconds,intact DP and PT pulses    Special Tests None      Imaging Studies:   3 views of the left foot were reviewed and interpreted independently that demonstrate zone 1 fifth metatarsal fracture minimally displaced. Reviewed by me personally.        James R. Lachman, MD  Foot & Ankle Surgery   Department of Orthopaedic Surgery  [facility]      I personally performed the service.    James R. Lachman, MD

## 2024-02-12 ENCOUNTER — TELEPHONE (OUTPATIENT)
Dept: OBGYN CLINIC | Facility: CLINIC | Age: 39
End: 2024-02-12

## 2024-02-21 ENCOUNTER — APPOINTMENT (OUTPATIENT)
Dept: RADIOLOGY | Facility: AMBULARY SURGERY CENTER | Age: 39
End: 2024-02-21
Attending: ORTHOPAEDIC SURGERY
Payer: COMMERCIAL

## 2024-02-21 ENCOUNTER — OFFICE VISIT (OUTPATIENT)
Dept: OBGYN CLINIC | Facility: CLINIC | Age: 39
End: 2024-02-21

## 2024-02-21 VITALS — HEIGHT: 73 IN | BODY MASS INDEX: 23.19 KG/M2 | WEIGHT: 175 LBS

## 2024-02-21 DIAGNOSIS — S92.355A NONDISPLACED FRACTURE OF FIFTH METATARSAL BONE, LEFT FOOT, INITIAL ENCOUNTER FOR CLOSED FRACTURE: ICD-10-CM

## 2024-02-21 DIAGNOSIS — Z01.89 ENCOUNTER FOR LOWER EXTREMITY COMPARISON IMAGING STUDY: ICD-10-CM

## 2024-02-21 DIAGNOSIS — S92.355A NONDISPLACED FRACTURE OF FIFTH METATARSAL BONE, LEFT FOOT, INITIAL ENCOUNTER FOR CLOSED FRACTURE: Primary | ICD-10-CM

## 2024-02-21 PROCEDURE — 73620 X-RAY EXAM OF FOOT: CPT

## 2024-02-21 PROCEDURE — 99024 POSTOP FOLLOW-UP VISIT: CPT | Performed by: ORTHOPAEDIC SURGERY

## 2024-02-21 PROCEDURE — 73630 X-RAY EXAM OF FOOT: CPT

## 2024-02-21 NOTE — PROGRESS NOTES
James R Lachman, M.D.  Attending, Orthopaedic Surgery  Foot and Ankle  [facility]      ORTHOPAEDIC FOOT AND ANKLE CLINIC VISIT     Assessment:     Encounter Diagnoses   Name Primary?    Nondisplaced fracture of fifth metatarsal bone, left foot, initial encounter for closed fracture Yes    Encounter for lower extremity comparison imaging study             Plan:   The patient verbalized understanding of exam findings and treatment plan. We engaged in the shared decision-making process and treatment options were discussed at length with the patient. Surgical and conservative management discussed today along with risks and benefits.  Patient has Zone 1 fifth metatarsal fracture that is minimally displaced sustained 12/27/2023. Amenable to non operative treatment.  Patient may wean out of CAM walker boot, instructions provided  Activities as tolerated  Return if symptoms worsen or fail to improve.      History of Present Illness:   Chief Complaint:   Chief Complaint   Patient presents with    Follow-up     Follow up broken foot. Left foot in cam boot. Walking great.     Chase Valdes is a 38 y.o. male who is being seen in follow-up for left zone 1 fifth metatarsal fracture DOI 12/27/2023. When we last saw he we recommended WBAT in CAM walker boot.  Pain has improved. Residual pain is localized at base of 5th with minimal radiating and described as sharp and severe.      Pain/symptom timing:  Worse during the day when active  Pain/symptom context:  Worse with activites and work  Pain/symptom modifying factors:  Rest makes better, activities make worse  Pain/symptom associated signs/symptoms: none    Prior treatment   NSAIDsYes   Injections No   Bracing/Orthotics Yes    Physical Therapy No     Orthopedic Surgical History:   See below    Past Medical, Surgical and Social History:  Past Medical History:  has no past medical history on file.  Problem List: does not have any pertinent problems on  "file.  Past Surgical History:  has a past surgical history that includes Hernia repair; Appendectomy; and Knee surgery (Right).  Family History: family history is not on file.  Social History:  reports that he has never smoked. He has never been exposed to tobacco smoke. He has never used smokeless tobacco. He reports that he does not drink alcohol. No history on file for drug use.  Current Medications: has a current medication list which includes the following prescription(s): mupirocin and triamcinolone.  Allergies: is allergic to fluconazole and vancomycin.     Review of Systems:  General- denies fever/chills  HEENT- denies hearing loss or sore throat  Eyes- denies eye pain or visual disturbances, denies red eyes  Respiratory- denies cough or SOB  Cardio- denies chest pain or palpitations  GI- denies abdominal pain  Endocrine- denies urinary frequency  Urinary- denies pain with urination  Musculoskeletal- Negative except noted above  Skin- denies rashes or wounds  Neurological- denies dizziness or headache  Psychiatric- denies anxiety or difficulty concentrating    Physical Exam:   Ht 6' 1\" (1.854 m)   Wt 79.4 kg (175 lb)   BMI 23.09 kg/m²   General/Constitutional: No apparent distress: well-nourished and well developed.  Eyes: normal ocular motion  Lymphatic: No appreciable lymphadenopathy  Respiratory: Non-labored breathing  Vascular: No edema, swelling or tenderness, except as noted in detailed exam.  Integumentary: No impressive skin lesions present, except as noted in detailed exam.  Neuro: No ataxia or tremors noted  Psych: Normal mood and affect, oriented to person, place and time. Appropriate affect.  Musculoskeletal: Normal, except as noted in detailed exam and in HPI.    Examination    Left    Gait               Normal with CAM boot   Musculoskeletal Tender to palpation at fracture site    Skin Normal.      Nails Normal    Range of Motion  20 degrees dorsiflexion, 30 degrees plantarflexion  Subtalar " motion: normal    Stability Stable    Muscle Strength 5/5 tibialis anterior  5/5 gastrocnemius-soleus  5/5 posterior tibialis  5/5 peroneal/eversion strength  5/5 EHL  5/5 FHL    Neurologic Normal    Sensation  Intact to light touch throughout sural, saphenous, superficial peroneal, deep peroneal and medial/lateral plantar nerve distributions.  [place]-Vielka 5.07 filament (10g) testing  deferred.    Cardiovascular Brisk capillary refill < 2 seconds,intact DP and PT pulses    Special Tests None      Imaging Studies:       3 views of the Left foot were obtained, reviewed and interpreted independently which demonstrate zone 1 fifth metatarsal fracture minimally displaced.  Reviewed by me personally.      James R. Lachman, MD  Foot & Ankle Surgery   Department of Orthopaedic Surgery  [facility]      I personally performed the service.    James R. Lachman, MD    Scribe Attestation      I,:  Gela Morrow am acting as a scribe while in the presence of the attending physician.:       I,:  James R Lachman, MD personally performed the services described in this documentation    as scribed in my presence.:

## 2024-02-21 NOTE — PATIENT INSTRUCTIONS
You may begin weaning your boot and transitioning to a sneaker (Today). It is important to do this gradually to avoid aggravating the healing process.    Today, you may come out of the boot into a sneaker for 2 hours.  2. Tomorrow, you may come out of the boot into a sneaker for 4 hours,  3. The next day, you may come out of the boot into a sneaker for 6 hours.  4. Continue this (adding 2 hours per day) as you tolerate. For example, if you do 6 hours out of the boot into a sneaker and your foot swells more than usual at night and it is difficult to control the discomfort, do not advance to 8 hours the next day, stay at 6 hours until you are able to tolerate it.    It is essential to follow this protocol and not modify this.  No matter when you begin weaning the boot, it will be difficult and there will be swelling and soreness.  If you spend more time than is recommended in the boot, this process becomes longer and more painful.    Elevation, Ice and tylenol and staying off of it at night will be important to aide in this transition out of the boot. Swelling and soreness are normal as you begin to do more with the injured leg.    Compression stocking (Knee high, 20-30mm Hg) to be worn at all times while awake.  Recommend taking the following supplements: Vitamin D 4000 units per day and Calcium 1200 mg per day. This will help with bone healing.